# Patient Record
Sex: MALE | Race: WHITE | Employment: FULL TIME | URBAN - METROPOLITAN AREA
[De-identification: names, ages, dates, MRNs, and addresses within clinical notes are randomized per-mention and may not be internally consistent; named-entity substitution may affect disease eponyms.]

---

## 2018-07-30 ENCOUNTER — HOSPITAL ENCOUNTER (EMERGENCY)
Age: 53
Discharge: HOME | End: 2018-07-30
Attending: EMERGENCY MEDICINE
Payer: COMMERCIAL

## 2018-07-30 VITALS
WEIGHT: 200 LBS | HEIGHT: 72 IN | OXYGEN SATURATION: 99 % | DIASTOLIC BLOOD PRESSURE: 60 MMHG | BODY MASS INDEX: 27.09 KG/M2 | SYSTOLIC BLOOD PRESSURE: 110 MMHG | TEMPERATURE: 98.4 F | RESPIRATION RATE: 16 BRPM | HEART RATE: 48 BPM

## 2018-07-30 DIAGNOSIS — G43.901 MIGRAINE WITH STATUS MIGRAINOSUS, NOT INTRACTABLE, UNSPECIFIED MIGRAINE TYPE: Primary | ICD-10-CM

## 2018-07-30 PROCEDURE — 96372 THER/PROPH/DIAG INJ SC/IM: CPT

## 2018-07-30 PROCEDURE — 6360000002 HC RX W HCPCS: Performed by: EMERGENCY MEDICINE

## 2018-07-30 PROCEDURE — 99282 EMERGENCY DEPT VISIT SF MDM: CPT

## 2018-07-30 RX ORDER — ATORVASTATIN CALCIUM 20 MG/1
20 TABLET, FILM COATED ORAL DAILY
COMMUNITY

## 2018-07-30 RX ORDER — VENLAFAXINE 50 MG/1
50 TABLET ORAL 3 TIMES DAILY
COMMUNITY

## 2018-07-30 RX ORDER — ARIPIPRAZOLE 5 MG/1
5 TABLET ORAL DAILY
COMMUNITY

## 2018-07-30 RX ORDER — LISINOPRIL 40 MG/1
40 TABLET ORAL DAILY
COMMUNITY

## 2018-07-30 RX ORDER — RIZATRIPTAN BENZOATE 10 MG/1
10 TABLET ORAL
Qty: 30 TABLET | Refills: 3 | Status: SHIPPED | OUTPATIENT
Start: 2018-07-30 | End: 2018-07-30

## 2018-07-30 RX ORDER — SUMATRIPTAN 6 MG/.5ML
6 INJECTION, SOLUTION SUBCUTANEOUS ONCE
Status: COMPLETED | OUTPATIENT
Start: 2018-07-30 | End: 2018-07-30

## 2018-07-30 RX ADMIN — SUMATRIPTAN 6 MG: 6 INJECTION SUBCUTANEOUS at 11:33

## 2021-07-27 ENCOUNTER — HOSPITAL ENCOUNTER (OUTPATIENT)
Dept: GENERAL RADIOLOGY | Age: 56
Discharge: HOME | End: 2021-07-29

## 2021-07-27 ENCOUNTER — HOSPITAL ENCOUNTER (OUTPATIENT)
Age: 56
Discharge: HOME | End: 2021-07-27

## 2021-07-27 DIAGNOSIS — Z00.00 PHYSICAL EXAM: ICD-10-CM

## 2021-07-27 PROCEDURE — 71046 X-RAY EXAM CHEST 2 VIEWS: CPT

## 2023-10-18 ENCOUNTER — HOSPITAL ENCOUNTER (OUTPATIENT)
Dept: PSYCHIATRY | Age: 58
Setting detail: THERAPIES SERIES
Discharge: HOME | End: 2023-10-18

## 2023-10-18 NOTE — CONSULTS
Department of Psychiatry  ECT consult   Psychiatric Assessment  10/18/2023    TELEHEALTH EVALUATION -- Audio/Visual        Brandon Mosley, was evaluated through a synchronous (real-time) audio-video encounter. The patient (or guardian if applicable) is aware that this is a billable service, which includes applicable co-pays. This Virtual Visit was conducted with patient's (and/or legal guardian's) consent. Patient identification was verified, and a caregiver was present when appropriate. The patient was located at Home: [address] [place] [address] Dr. Christie Pelletier [id number]  Provider was located at [address]): [address],  [address]  I am licensed in the state of [place] where the patient resides        Thank you very much for allowing us to participate in the care of this patient. Reason for Consult:  evaluation for ECT    HISTORY OF PRESENT ILLNESS:          The patient is a 62 y.o. left-handed male with significant history of major depressive disorder, generalized anxiety disorder, panic disorder who is referred by his outpatient psychiatrist for evaluation of ECT. Patient is present with his wife on the call and patient gives permission to have his wife participate in the interview. They are located in their home. Visit is conducted over telehealth    Patient reports suffering from severe anxiety and depression his whole life. He states that he has been on numerous medications as outlined below in his both current and past medication history. He states when the medications do help to alleviate the intensity of his depression and anxiety they have not been able to relieve him of that. Furthermore he reports worsening depression and anxiety over the past several months to years that has led to an inability to maintain a full-time job. He has had problems with maintaining employment due to requiring absences due to his anxiety and depression.   This is a significant decrease in his function as he

## 2023-10-20 DIAGNOSIS — F33.2 SEVERE RECURRENT MAJOR DEPRESSION WITHOUT PSYCHOTIC FEATURES (HCC): Primary | ICD-10-CM

## 2023-10-20 DIAGNOSIS — Z01.818 PRE-OP TESTING: ICD-10-CM

## 2023-10-22 NOTE — H&P (VIEW-ONLY)
sounds. No localized tenderness. No guarding or rigidity. LYMPHATICS:  No palpable cervical lymphadenopathy. LOCOMOTOR, BACK AND SPINE:  No tenderness or deformities. EXTREMITIES:  Symmetrical, no pretibial edema. No calf tenderness. No discoloration or ulcerations. NEUROLOGIC:  The patient is conscious, alert, oriented,Cranial nerve II-XII intact, taste and smell were not examined. No apparent focal sensory or motor deficits. LAB REVIEW      Lab Results   Component Value Date    WBC 7.0 10/23/2023    RBC 4.92 10/23/2023    HGB 13.4 (L) 10/23/2023    HCT 40.6 (L) 10/23/2023    MCV 82.7 10/23/2023    MCH 27.3 10/23/2023    MCHC 33.1 10/23/2023    RDW 16.1 (H) 10/23/2023     10/23/2023    MPV 7.7 10/23/2023        Lab Results   Component Value Date     04/08/2015    K 4.5 04/08/2015    CL 95 (L) 04/08/2015    CO2 26 04/08/2015    BUN 17 04/08/2015    CREATININE 1.0 04/08/2015    GLUCOSE 111 (H) 04/08/2015    CALCIUM 9.7 04/08/2015    PROT 8.1 04/08/2015    LABALBU 4.9 04/08/2015    BILITOT 1.1 04/08/2015    ALKPHOS 79 04/08/2015     (H) 04/08/2015     (H) 04/08/2015                                         EKG REVIEW        Preliminary EKG was done today reading Normal Sinus Rhythm                PROVISIONAL DIAGNOSES / SURGERY:      There are no problems to display for this patient. CLEARANCE:   Based on my personal evaluation of patient including review of patient's chart, no clearance required for scheduled surgery .      DI FENTON, JOURDAN - CNP on 10/23/2023 at 8:20 AM    Total time spent on encounter- PAT provider minutes: 21-30 minutes    Note marked for cosign by provider

## 2023-10-22 NOTE — H&P (VIEW-ONLY)
too far for him to get benefit from [address] alone. After discussion of risk benefits and alternatives with regards to other treatment modalities we did agree that the patient and his wife would think about ECT and call us if they decide to move forward. Spent time in discussing side effects to ECT, specifically emphasize prominent cognitive side effects and short-term memory impairment associated with ECT. Patient does describe his depression as feeling down, depressed, low energy, low mood, poor appetite, poor sleep, poor concentration, poor energy, suicidal thoughts. Denying any auditory or visual hallucinations. Denying any signs or symptoms consistent with daysi. Does endorse also feeling excess anxiety which presents in the form of feeling keyed up or on edge, low energy, fatigue, muscle tension, racing thoughts or distressing thoughts with regards to worries and he feels the worry is excessive and disruptive to his quality of life. UPDATE  Pt reports that he has never had any  ECT treatment before. He reports that he has exhausted many modalities and is willing to try ECT now. Pt is Rating average mood 4/10  with 10 being the best mood  patient states he's been more anxious. . Pt admits to  moderate depression  with low energy. Patient's sleep has been OK . Appetite has been fine . Pt denies any thoughts of suicidal, recently. Pt also denies any thoughts of homicidal. Pt denies auditory/visual hallucinations. Pt reports being compliant with taking all prescribed medications. Pt AAO x 3 in NAD. HRRR. No adventitious lung sounds. No respiratory distress. NPO p MN. Took NO medications this am. Denies recent or current chest pain/pressure, palpitations, SOB, recent URI, fever or chills. Patient denies any changes in medical condition. Patient denies any issues with Anesthesia. Review vitals per RN flowsheet.       Significant medical history: Hypertension, HLD, PreDiabetes, Gout,

## 2023-10-22 NOTE — H&P (VIEW-ONLY)
HISTORY and [id number]       NAME:  Annetta Feng  MRN: [id number]   YOB: 1965   Date: 10/23/2023   Age: 62 y.o. Gender: male       COMPLAINT AND PRESENT HISTORY:     Annetta Feng is 62 y.o. , male, Preadmission Testing for ECT treatments. Psychiatry assessment per Dr Yassine Ross  10/18,2023  HISTORY OF PRESENT ILLNESS:    The patient is a 62 y.o. left-handed male with significant history of major depressive disorder, generalized anxiety disorder, panic disorder who is referred by his outpatient psychiatrist for evaluation of ECT. Patient is present with his wife on the call and patient gives permission to have his wife participate in the interview. They are located in their home. Visit is conducted over telehealth     Patient reports suffering from severe anxiety and depression his whole life. He states that he has been on numerous medications as outlined below in his both current and past medication history. He states when the medications do help to alleviate the intensity of his depression and anxiety they have not been able to relieve him of that. Furthermore he reports worsening depression and anxiety over the past several months to years that has led to an inability to maintain a full-time job. He has had problems with maintaining employment due to requiring absences due to his anxiety and depression. This is a significant decrease in his function as he has been employed at [employer] for over 20 years with good job attendance. He endorses passive suicidal ideations but denies any intention or plan and is able to contract for safety. He currently is involved with his therapist Renetta Pitts as well as his outpatient psychiatrist Dr. Bryan Sorenson in [place]. He has had discussions of transcranial magnetic stimulation, ECT.   At present time he reports after discussion about [place] with his outpatient psychiatrist the feeling was that things have advanced

## 2023-10-23 ENCOUNTER — HOSPITAL ENCOUNTER (OUTPATIENT)
Dept: PREADMISSION TESTING | Age: 58
Discharge: HOME | End: 2023-10-27
Payer: COMMERCIAL

## 2023-10-23 VITALS
BODY MASS INDEX: 26.82 KG/M2 | DIASTOLIC BLOOD PRESSURE: 75 MMHG | SYSTOLIC BLOOD PRESSURE: 111 MMHG | RESPIRATION RATE: 18 BRPM | WEIGHT: 198 LBS | TEMPERATURE: 97.3 F | HEIGHT: 72 IN | HEART RATE: 96 BPM | OXYGEN SATURATION: 95 %

## 2023-10-23 DIAGNOSIS — Z01.818 PRE-OP TESTING: ICD-10-CM

## 2023-10-23 LAB
ANION GAP SERPL CALCULATED.3IONS-SCNC: 11 MMOL/L (ref 9–17)
BASOPHILS # BLD: 0 K/UL (ref 0–0.2)
BASOPHILS NFR BLD: 0 % (ref 0–2)
BUN SERPL-MCNC: 15 MG/DL (ref 6–20)
CALCIUM SERPL-MCNC: 9.2 MG/DL (ref 8.6–10.4)
CHLORIDE SERPL-SCNC: 99 MMOL/L (ref 98–107)
CO2 SERPL-SCNC: 27 MMOL/L (ref 20–31)
CREAT SERPL-MCNC: 1 MG/DL (ref 0.7–1.2)
EOSINOPHIL # BLD: 0.1 K/UL (ref 0–0.4)
EOSINOPHILS RELATIVE PERCENT: 1 % (ref 0–4)
ERYTHROCYTE [DISTWIDTH] IN BLOOD BY AUTOMATED COUNT: 16.1 % (ref 11.5–14.9)
GFR SERPL CREATININE-BSD FRML MDRD: >60 ML/MIN/1.73M2
GLUCOSE SERPL-MCNC: 163 MG/DL (ref 70–99)
HCT VFR BLD AUTO: 40.6 % (ref 41–53)
HGB BLD-MCNC: 13.4 G/DL (ref 13.5–17.5)
LYMPHOCYTES NFR BLD: 2.2 K/UL (ref 1–4.8)
LYMPHOCYTES RELATIVE PERCENT: 31 % (ref 24–44)
MCH RBC QN AUTO: 27.3 PG (ref 26–34)
MCHC RBC AUTO-ENTMCNC: 33.1 G/DL (ref 31–37)
MCV RBC AUTO: 82.7 FL (ref 80–100)
MONOCYTES NFR BLD: 0.5 K/UL (ref 0.1–1.3)
MONOCYTES NFR BLD: 7 % (ref 1–7)
NEUTROPHILS NFR BLD: 61 % (ref 36–66)
NEUTS SEG NFR BLD: 4.2 K/UL (ref 1.3–9.1)
PLATELET # BLD AUTO: 221 K/UL (ref 150–450)
PMV BLD AUTO: 7.7 FL (ref 6–12)
POTASSIUM SERPL-SCNC: 4.4 MMOL/L (ref 3.7–5.3)
RBC # BLD AUTO: 4.92 M/UL (ref 4.5–5.9)
SODIUM SERPL-SCNC: 137 MMOL/L (ref 135–144)
WBC OTHER # BLD: 7 K/UL (ref 3.5–11)

## 2023-10-23 PROCEDURE — 93005 ELECTROCARDIOGRAM TRACING: CPT | Performed by: ANESTHESIOLOGY

## 2023-10-23 PROCEDURE — 85025 COMPLETE CBC W/AUTO DIFF WBC: CPT

## 2023-10-23 PROCEDURE — 36415 COLL VENOUS BLD VENIPUNCTURE: CPT

## 2023-10-23 PROCEDURE — 80048 BASIC METABOLIC PNL TOTAL CA: CPT

## 2023-10-23 RX ORDER — CARIPRAZINE 3 MG/1
3 CAPSULE, GELATIN COATED ORAL DAILY
COMMUNITY
Start: 2023-09-11

## 2023-10-23 RX ORDER — PROPRANOLOL HYDROCHLORIDE 10 MG/1
10 TABLET ORAL
COMMUNITY
Start: 2022-01-05

## 2023-10-23 RX ORDER — BUSPIRONE HYDROCHLORIDE 30 MG/1
30 TABLET ORAL 2 TIMES DAILY
COMMUNITY
Start: 2023-10-03

## 2023-10-23 RX ORDER — OMEPRAZOLE 20 MG/1
20 CAPSULE, DELAYED RELEASE ORAL DAILY
COMMUNITY

## 2023-10-23 NOTE — DISCHARGE INSTRUCTIONS
Pre-op Instructions For Out-Patient Endoscopy Surgery    Medication Instructions:  Please stop herbs and any supplements now (includes vitamins and minerals). Please contact your surgeon and prescribing physician for pre-op instructions for any blood thinners. If you have inhalers/aerosol treatments at home, please use them the morning of your surgery and bring the inhalers with you to the hospital.    Please take the following medications the morning of your surgery with a sip of water:    Propranolol, Prilosec    Surgery Instructions:  After midnight before surgery:  Do not eat or drink anything, including water, mints, gum, and hard candy. You may brush your teeth without swallowing. No smoking, chewing tobacco, or street drugs. Please shower or bathe before surgery. Please do not wear any cologne, lotion, powder, jewelry, piercings, perfume, makeup, nail polish, hair accessories, or hair spray on the day of surgery. Wear loose comfortable clothing. Leave your valuables at home but bring a payment source for any after-surgery prescriptions you plan to fill at [facility]. Bring a storage case for any glasses/contacts. An adult who is responsible for you MUST drive you home and should be with you for the first 24 hours after surgery. The Day of Surgery:  Arrive at [facility] AT [facility] Surgery Entrance at the time directed by your surgeon and check in at the desk. If you have a living will or healthcare power of , please bring a copy. You will be taken to the pre-op holding area where you will be prepared for surgery. A physical assessment will be performed by a nurse practitioner or house officer. Your IV will be started and you will meet your anesthesiologist.    When you go to surgery, your family will be directed to the surgical waiting room, where the doctor should speak with them after your surgery.     After surgery, you will be taken

## 2023-10-24 ENCOUNTER — ANESTHESIA EVENT (OUTPATIENT)
Dept: POSTOP/PACU | Age: 58
End: 2023-10-24
Payer: COMMERCIAL

## 2023-10-24 DIAGNOSIS — F33.2 SEVERE RECURRENT MAJOR DEPRESSION WITHOUT PSYCHOTIC FEATURES (HCC): Primary | ICD-10-CM

## 2023-10-24 LAB
EKG ATRIAL RATE: 87 BPM
EKG P AXIS: 18 DEGREES
EKG P-R INTERVAL: 174 MS
EKG Q-T INTERVAL: 366 MS
EKG QRS DURATION: 96 MS
EKG QTC CALCULATION (BAZETT): 440 MS
EKG R AXIS: 10 DEGREES
EKG T AXIS: -23 DEGREES
EKG VENTRICULAR RATE: 87 BPM

## 2023-10-24 PROCEDURE — 93010 ELECTROCARDIOGRAM REPORT: CPT | Performed by: INTERNAL MEDICINE

## 2023-10-25 ENCOUNTER — ANESTHESIA (OUTPATIENT)
Dept: POSTOP/PACU | Age: 58
End: 2023-10-25
Payer: COMMERCIAL

## 2023-10-25 ENCOUNTER — HOSPITAL ENCOUNTER (OUTPATIENT)
Dept: POSTOP/PACU | Age: 58
Discharge: HOME | End: 2023-10-25
Payer: COMMERCIAL

## 2023-10-25 VITALS
DIASTOLIC BLOOD PRESSURE: 83 MMHG | BODY MASS INDEX: 26.82 KG/M2 | HEIGHT: 72 IN | SYSTOLIC BLOOD PRESSURE: 96 MMHG | HEART RATE: 80 BPM | RESPIRATION RATE: 19 BRPM | TEMPERATURE: 96.9 F | OXYGEN SATURATION: 92 % | WEIGHT: 198 LBS

## 2023-10-25 PROCEDURE — 3700000000 HC ANESTHESIA ATTENDED CARE: Performed by: ANESTHESIOLOGY

## 2023-10-25 PROCEDURE — 90870 ELECTROCONVULSIVE THERAPY: CPT | Performed by: PSYCHIATRY & NEUROLOGY

## 2023-10-25 PROCEDURE — 7100000001 HC PACU RECOVERY - ADDTL 15 MIN: Performed by: ANESTHESIOLOGY

## 2023-10-25 PROCEDURE — 2500000003 HC RX 250 WO HCPCS: Performed by: ANESTHESIOLOGY

## 2023-10-25 PROCEDURE — 90870 ELECTROCONVULSIVE THERAPY: CPT | Performed by: ANESTHESIOLOGY

## 2023-10-25 PROCEDURE — 7100000000 HC PACU RECOVERY - FIRST 15 MIN: Performed by: ANESTHESIOLOGY

## 2023-10-25 PROCEDURE — 2500000003 HC RX 250 WO HCPCS: Performed by: NURSE ANESTHETIST, CERTIFIED REGISTERED

## 2023-10-25 PROCEDURE — 2580000003 HC RX 258: Performed by: ANESTHESIOLOGY

## 2023-10-25 RX ORDER — DIPHENHYDRAMINE HYDROCHLORIDE 50 MG/ML
12.5 INJECTION INTRAMUSCULAR; INTRAVENOUS
Status: DISCONTINUED | OUTPATIENT
Start: 2023-10-25 | End: 2023-10-26 | Stop reason: HOSPADM

## 2023-10-25 RX ORDER — SODIUM CHLORIDE 9 MG/ML
INJECTION, SOLUTION INTRAVENOUS PRN
Status: DISCONTINUED | OUTPATIENT
Start: 2023-10-25 | End: 2023-10-26 | Stop reason: HOSPADM

## 2023-10-25 RX ORDER — SEMAGLUTIDE 1.34 MG/ML
1 INJECTION, SOLUTION SUBCUTANEOUS
COMMUNITY
Start: 2023-10-09

## 2023-10-25 RX ORDER — METHOHEXITAL IN WATER/PF 100MG/10ML
SYRINGE (ML) INTRAVENOUS
Status: COMPLETED
Start: 2023-10-25 | End: 2023-10-25

## 2023-10-25 RX ORDER — SODIUM CHLORIDE, SODIUM LACTATE, POTASSIUM CHLORIDE, CALCIUM CHLORIDE 600; 310; 30; 20 MG/100ML; MG/100ML; MG/100ML; MG/100ML
INJECTION, SOLUTION INTRAVENOUS CONTINUOUS
Status: DISCONTINUED | OUTPATIENT
Start: 2023-10-25 | End: 2023-10-26 | Stop reason: HOSPADM

## 2023-10-25 RX ORDER — SUCCINYLCHOLINE/SOD CL,ISO/PF 200MG/10ML
SYRINGE (ML) INTRAVENOUS PRN
Status: DISCONTINUED | OUTPATIENT
Start: 2023-10-25 | End: 2023-10-25 | Stop reason: SDUPTHER

## 2023-10-25 RX ORDER — ONDANSETRON 2 MG/ML
4 INJECTION INTRAMUSCULAR; INTRAVENOUS
Status: DISCONTINUED | OUTPATIENT
Start: 2023-10-25 | End: 2023-10-26 | Stop reason: HOSPADM

## 2023-10-25 RX ORDER — LIDOCAINE HYDROCHLORIDE 10 MG/ML
1 INJECTION, SOLUTION EPIDURAL; INFILTRATION; INTRACAUDAL; PERINEURAL
Status: COMPLETED | OUTPATIENT
Start: 2023-10-25 | End: 2023-10-25

## 2023-10-25 RX ORDER — METHOHEXITAL IN WATER/PF 100MG/10ML
SYRINGE (ML) INTRAVENOUS PRN
Status: DISCONTINUED | OUTPATIENT
Start: 2023-10-25 | End: 2023-10-25 | Stop reason: SDUPTHER

## 2023-10-25 RX ORDER — SODIUM CHLORIDE 0.9 % (FLUSH) 0.9 %
5-40 SYRINGE (ML) INJECTION EVERY 12 HOURS SCHEDULED
Status: DISCONTINUED | OUTPATIENT
Start: 2023-10-25 | End: 2023-10-26 | Stop reason: HOSPADM

## 2023-10-25 RX ORDER — SODIUM CHLORIDE 0.9 % (FLUSH) 0.9 %
5-40 SYRINGE (ML) INJECTION PRN
Status: DISCONTINUED | OUTPATIENT
Start: 2023-10-25 | End: 2023-10-26 | Stop reason: HOSPADM

## 2023-10-25 RX ORDER — FENTANYL CITRATE 0.05 MG/ML
50 INJECTION, SOLUTION INTRAMUSCULAR; INTRAVENOUS EVERY 5 MIN PRN
Status: DISCONTINUED | OUTPATIENT
Start: 2023-10-25 | End: 2023-10-26 | Stop reason: HOSPADM

## 2023-10-25 RX ORDER — UBROGEPANT 100 MG/1
100 TABLET ORAL DAILY PRN
COMMUNITY
Start: 2022-10-26

## 2023-10-25 RX ORDER — SUCCINYLCHOLINE/SOD CL,ISO/PF 200MG/10ML
SYRINGE (ML) INTRAVENOUS
Status: COMPLETED
Start: 2023-10-25 | End: 2023-10-25

## 2023-10-25 RX ORDER — FENTANYL CITRATE 0.05 MG/ML
25 INJECTION, SOLUTION INTRAMUSCULAR; INTRAVENOUS EVERY 5 MIN PRN
Status: DISCONTINUED | OUTPATIENT
Start: 2023-10-25 | End: 2023-10-26 | Stop reason: HOSPADM

## 2023-10-25 RX ADMIN — Medication 100 MG: at 07:53

## 2023-10-25 RX ADMIN — LIDOCAINE HYDROCHLORIDE 1 ML: 10 INJECTION, SOLUTION EPIDURAL; INFILTRATION; INTRACAUDAL; PERINEURAL at 07:25

## 2023-10-25 RX ADMIN — SODIUM CHLORIDE, POTASSIUM CHLORIDE, SODIUM LACTATE AND CALCIUM CHLORIDE: 600; 310; 30; 20 INJECTION, SOLUTION INTRAVENOUS at 07:24

## 2023-10-25 ASSESSMENT — PAIN SCALES - GENERAL
PAINLEVEL_OUTOF10: 3
PAINLEVEL_OUTOF10: 3

## 2023-10-25 ASSESSMENT — PAIN - FUNCTIONAL ASSESSMENT: PAIN_FUNCTIONAL_ASSESSMENT: NONE - DENIES PAIN

## 2023-10-25 ASSESSMENT — PAIN DESCRIPTION - DESCRIPTORS: DESCRIPTORS: ACHING

## 2023-10-25 ASSESSMENT — PAIN DESCRIPTION - PAIN TYPE: TYPE: ACUTE PAIN

## 2023-10-25 ASSESSMENT — PAIN DESCRIPTION - ONSET: ONSET: AWAKENED FROM SLEEP

## 2023-10-25 ASSESSMENT — PAIN DESCRIPTION - LOCATION: LOCATION: HEAD

## 2023-10-25 NOTE — INTERVAL H&P NOTE
Update History & Physical    The patient's History and Physical of October 23, 2023 was reviewed with the patient and I examined the patient. Any changes are as documented below. Vlad Holder is 62 y.o.  male, here for ECT treatment. Pt has not had previous ECT treatments. Rating average mood 7/10 with 10 being the best mood. Patient's sleep has been fair. Appetite has been fair. Pt is not suicidal. Pt is not homicidal. Pt denies auditory/visual hallucinations. Pt has been taking all medications as prescribed. Pt AAO x 3 in NAD. HRRR. No adventitious lung sounds. No respiratory distress. NPO p MN. Took prilosec and propanolol this am with sip of water. Denies recent or current chest pain/pressure, palpitations, SOB, recent URI, fever or chills. Review vitals per RN flowsheet.      Electronically signed by JOURDAN Wei CNP on 10/25/2023 at 7:00 AM

## 2023-10-25 NOTE — DISCHARGE INSTRUCTIONS
Sedation or General Anesthesia, Adult  Care After  Refer to this sheet in the next 24 hours. These instructions provide you with information on caring for yourself after your procedure. Your caregiver may also give you more specific instructions. Your treatment has been planned according to current medical practices, but problems sometimes occur. Call your caregiver if you have any problems or questions after your procedure. HOME CARE INSTRUCTIONS   Do not participate in any activities that require you to be alert or coordinated. Do not:  Drive. Swim. Ride a bicycle. Operate heavy machinery. [place]. Use power tools. Climb ladders. Work at [employer]. Take a bath. Do not drink alcohol. Do not make any important decisions or sign legal documents. Stay with an adult. The first meal following your procedure should be light and small. Avoid solid foods if you feel sick to your stomach (nauseous) or if you throw up (vomit). Drink enough fluids to keep your urine clear or pale yellow. Only take your usual medicines or new medicines if your caregiver approves them. Only take over-the-counter or prescription medicines for pain, discomfort, or fever as directed by your caregiver. Keep all follow-up appointments as directed by your caregiver. SEEK IMMEDIATE MEDICAL CARE IF:   You are not feeling normal or behaving normally after 24 hours. You have persistent nausea and vomiting. You are unable to drink fluids or eat food. You have difficulty urinating. You have difficulty breathing or speaking. You have blue or gray skin. There is difficulty waking or you cannot be woken up. You have heavy bleeding, redness, or a lot of swelling where the sedative or anesthesia entered your skin (intravenous site). You have a rash. MAKE SURE YOU:  Understand these instructions. Will watch your condition. Will get help right away if you are not doing well or get worse.   Document Released: 12/18/2006 Document Revised:

## 2023-10-25 NOTE — PROCEDURES
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)  Postprocedure diagnosis: SAME AS PRE-PROCEDURE DIAGNOSIS  Treatment Number: This is treatment # 1   Patient Status: Outpatient   Type of ECT: Bilateral Brief Pulse  Medications  Preprocedure: Tylenol 650mg  Anesthetic: Methohexital 100 mg  Paralytic: Succinylcholine 100 mg  Post procedure: none needed  mg  Cuff placement: Left Lower Extremity    MECTA Settings 1st Stimulus:     Pulse width: 1.0 ms   Frequency:  40 hz   Duration:   2.0 seconds   Static Impedance: 197 ohms             Dynamic Impedance: 172 ohms             Motor Seizure Duration: 41 seconds  EEG Seizure Duration: 60 seconds             The patient's ECT treatment was performed using MECTA machine  . Timeout:  Time out was performed using two patient identifiers and confirmation of procedure. Patient Preparation: Preprocedure documentation, labs, H&P were all verified and reviewed. The patient was placed in supine position. EEG leads were placed for monitoring of seizure. [place] areas bilaterally cleaned and prepped. Conductive gel  was applied over stimulus electrode site. The patient was pre-oxygenated. Procedure in detail: Medications were administered by anesthesia using intravenous access at the doses listed previously in this summary. Anesthetic administered and once confirmation the patient is anesthetized, the patient's blood pressure cuff on lower extremity was inflated to 100mmHg in excess of patient's blood pressure. At this time, the neuromuscular blockade was administered intravenously by anesthesia. Upper extremity fasciculation were verified followed by lower extremity fasciculation. Once fasciculations terminated, confirmation of affective neuromuscular blockade demonstrated by absence of withdrawal reflex.

## 2023-10-26 ENCOUNTER — ANESTHESIA EVENT (OUTPATIENT)
Dept: POSTOP/PACU | Age: 58
End: 2023-10-26
Payer: COMMERCIAL

## 2023-10-26 NOTE — PROGRESS NOTES
Pre ECT Assessment Note  Psychiatry  10/25/2023      Alise Goldman  1965  [id number]      Subjective:     Patient is a 62 y.o.  male seen for an evaluation prior to today's electroconvulsive therapy treatment. Today is treatment number 1, utilizing bilateral.  This is course number 1. The patient has never previously completed a course of ECT. Informed consent was completed with patient, wife also present. Risk/benefit and overall process was explained. He was agreeable to completing a depression screening prior to treatment. We will continue treatments 3x weekly and taper frequency as tolerated. Screening Tools:    MADRS Score 10/25/2023 =  22      There are no problems to display for this patient. Past Medical History:   Diagnosis Date    Depression     GERD (gastroesophageal reflux disease)     Gout     Hypertension     Migraines     Pre-diabetes       Past Surgical History:   Procedure Laterality Date    APPENDECTOMY  2006    COLONOSCOPY      FINGER AMPUTATION Right     middle, partial      Not in a hospital admission. Allergies   Allergen Reactions    Codeine       Social History     Tobacco Use    Smoking status: Never    Smokeless tobacco: Never   Substance Use Topics    Alcohol use: Yes     Comment: social      History reviewed. No pertinent family history.        Objective:       Mental Status Evaluation:  Appearance:  Wearing gown, on stretcher, well groomed   Behavior:  Cooperative, pleasant   Speech:  Normal rate, volume and tone   Mood:  depressed   Affect:  Congruent   Thought Process:  Linear and organized   Thought Content:  Passive suicidal ideation   Sensorium:  Does not appear to attend to internal stimuli   Cognition:  Oriented to person, place and general circumstance   Insight:  good   Judgment:  good     Assessment:     Diagnosis: Major depressive disorder, recurrent, severe without psychotic features    Plan:     Continue bilateral ECT three times weekly  Taper

## 2023-10-27 ENCOUNTER — ANESTHESIA EVENT (OUTPATIENT)
Dept: POSTOP/PACU | Age: 58
End: 2023-10-27
Payer: COMMERCIAL

## 2023-10-27 ENCOUNTER — HOSPITAL ENCOUNTER (OUTPATIENT)
Dept: POSTOP/PACU | Age: 58
Discharge: HOME | End: 2023-10-27
Payer: COMMERCIAL

## 2023-10-27 ENCOUNTER — ANESTHESIA (OUTPATIENT)
Dept: POSTOP/PACU | Age: 58
End: 2023-10-27
Payer: COMMERCIAL

## 2023-10-27 VITALS
OXYGEN SATURATION: 100 % | RESPIRATION RATE: 13 BRPM | HEART RATE: 83 BPM | WEIGHT: 198 LBS | BODY MASS INDEX: 26.82 KG/M2 | DIASTOLIC BLOOD PRESSURE: 84 MMHG | TEMPERATURE: 97.1 F | SYSTOLIC BLOOD PRESSURE: 120 MMHG | HEIGHT: 72 IN

## 2023-10-27 LAB — GLUCOSE BLD-MCNC: 113 MG/DL (ref 75–110)

## 2023-10-27 PROCEDURE — 90870 ELECTROCONVULSIVE THERAPY: CPT

## 2023-10-27 PROCEDURE — 7100000001 HC PACU RECOVERY - ADDTL 15 MIN: Performed by: ANESTHESIOLOGY

## 2023-10-27 PROCEDURE — 90870 ELECTROCONVULSIVE THERAPY: CPT | Performed by: PSYCHIATRY & NEUROLOGY

## 2023-10-27 PROCEDURE — 3700000000 HC ANESTHESIA ATTENDED CARE: Performed by: ANESTHESIOLOGY

## 2023-10-27 PROCEDURE — 2500000003 HC RX 250 WO HCPCS: Performed by: NURSE ANESTHETIST, CERTIFIED REGISTERED

## 2023-10-27 PROCEDURE — 82947 ASSAY GLUCOSE BLOOD QUANT: CPT

## 2023-10-27 PROCEDURE — 2580000003 HC RX 258: Performed by: ANESTHESIOLOGY

## 2023-10-27 PROCEDURE — 7100000000 HC PACU RECOVERY - FIRST 15 MIN: Performed by: ANESTHESIOLOGY

## 2023-10-27 PROCEDURE — 2500000003 HC RX 250 WO HCPCS: Performed by: ANESTHESIOLOGY

## 2023-10-27 RX ORDER — SODIUM CHLORIDE 9 MG/ML
INJECTION, SOLUTION INTRAVENOUS PRN
Status: DISCONTINUED | OUTPATIENT
Start: 2023-10-27 | End: 2023-10-28 | Stop reason: HOSPADM

## 2023-10-27 RX ORDER — SUCCINYLCHOLINE/SOD CL,ISO/PF 200MG/10ML
SYRINGE (ML) INTRAVENOUS
Status: COMPLETED
Start: 2023-10-27 | End: 2023-10-27

## 2023-10-27 RX ORDER — SODIUM CHLORIDE 0.9 % (FLUSH) 0.9 %
5-40 SYRINGE (ML) INJECTION PRN
Status: DISCONTINUED | OUTPATIENT
Start: 2023-10-27 | End: 2023-10-28 | Stop reason: HOSPADM

## 2023-10-27 RX ORDER — SODIUM CHLORIDE 0.9 % (FLUSH) 0.9 %
5-40 SYRINGE (ML) INJECTION EVERY 12 HOURS SCHEDULED
Status: DISCONTINUED | OUTPATIENT
Start: 2023-10-27 | End: 2023-10-28 | Stop reason: HOSPADM

## 2023-10-27 RX ORDER — METHOHEXITAL IN WATER/PF 100MG/10ML
SYRINGE (ML) INTRAVENOUS
Status: COMPLETED
Start: 2023-10-27 | End: 2023-10-27

## 2023-10-27 RX ORDER — SODIUM CHLORIDE, SODIUM LACTATE, POTASSIUM CHLORIDE, CALCIUM CHLORIDE 600; 310; 30; 20 MG/100ML; MG/100ML; MG/100ML; MG/100ML
INJECTION, SOLUTION INTRAVENOUS CONTINUOUS
Status: DISCONTINUED | OUTPATIENT
Start: 2023-10-27 | End: 2023-10-28 | Stop reason: HOSPADM

## 2023-10-27 RX ORDER — SUCCINYLCHOLINE/SOD CL,ISO/PF 100 MG/5ML
SYRINGE (ML) INTRAVENOUS PRN
Status: DISCONTINUED | OUTPATIENT
Start: 2023-10-27 | End: 2023-10-27 | Stop reason: SDUPTHER

## 2023-10-27 RX ORDER — LIDOCAINE HYDROCHLORIDE 10 MG/ML
1 INJECTION, SOLUTION EPIDURAL; INFILTRATION; INTRACAUDAL; PERINEURAL
Status: COMPLETED | OUTPATIENT
Start: 2023-10-27 | End: 2023-10-27

## 2023-10-27 RX ORDER — METHOHEXITAL IN WATER/PF 100MG/10ML
SYRINGE (ML) INTRAVENOUS PRN
Status: DISCONTINUED | OUTPATIENT
Start: 2023-10-27 | End: 2023-10-27 | Stop reason: SDUPTHER

## 2023-10-27 RX ADMIN — SODIUM CHLORIDE, POTASSIUM CHLORIDE, SODIUM LACTATE AND CALCIUM CHLORIDE: 600; 310; 30; 20 INJECTION, SOLUTION INTRAVENOUS at 06:28

## 2023-10-27 RX ADMIN — Medication 140 MG: at 07:14

## 2023-10-27 RX ADMIN — LIDOCAINE HYDROCHLORIDE 1 ML: 10 INJECTION, SOLUTION EPIDURAL; INFILTRATION; INTRACAUDAL; PERINEURAL at 06:28

## 2023-10-27 RX ADMIN — Medication 100 MG: at 07:14

## 2023-10-27 ASSESSMENT — PAIN - FUNCTIONAL ASSESSMENT: PAIN_FUNCTIONAL_ASSESSMENT: 0-10

## 2023-10-27 NOTE — PROCEDURES
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)  Postprocedure diagnosis: SAME AS PRE-PROCEDURE DIAGNOSIS  Treatment Number: This is treatment # 2   Patient Status: Outpatient   Type of ECT: Bilateral Brief Pulse  Medications  Preprocedure: Tylenol 650mg  Anesthetic: Methohexital 100 mg  Paralytic: Succinylcholine 140 mg  Post procedure: none needed  mg  Cuff placement: Left Lower Extremity    MECTA Settings 1st Stimulus:     Pulse width: 1.0 ms   Frequency:  40 hz   Duration:   2.0 seconds   Static Impedance: 182 ohms             Dynamic Impedance: 173 ohms             Motor Seizure Duration: 46 seconds  EEG Seizure Duration: 62 seconds             The patient's ECT treatment was performed using MECTA machine  . Timeout:  Time out was performed using two patient identifiers and confirmation of procedure. Patient Preparation: Preprocedure documentation, labs, H&P were all verified and reviewed. The patient was placed in supine position. EEG leads were placed for monitoring of seizure. [place] areas bilaterally cleaned and prepped. Conductive gel  was applied over stimulus electrode site. The patient was pre-oxygenated. Procedure in detail: Medications were administered by anesthesia using intravenous access at the doses listed previously in this summary. Anesthetic administered and once confirmation the patient is anesthetized, the patient's blood pressure cuff on lower extremity was inflated to 100mmHg in excess of patient's blood pressure. At this time, the neuromuscular blockade was administered intravenously by anesthesia. Upper extremity fasciculation were verified followed by lower extremity fasciculation. Once fasciculations terminated, confirmation of affective neuromuscular blockade demonstrated by absence of withdrawal reflex.

## 2023-10-27 NOTE — H&P (VIEW-ONLY)
breath sounds. No adventitious sounds. ABDOMEN:  Obese. Soft on palpation. Normoactive bowel sounds. No localized tenderness. No guarding or rigidity. LYMPHATICS:  No palpable cervical lymphadenopathy. LOCOMOTOR, BACK AND SPINE:  No tenderness or deformities. EXTREMITIES:  Symmetrical, no pretibial edema. No calf tenderness. No discoloration or ulcerations. NEUROLOGIC:  The patient is conscious, alert, oriented,Cranial nerve II-XII intact, taste and smell were not examined. No apparent focal sensory or motor deficits. LAB REVIEW       Lab Results  Component Value Date    WBC 7.0 10/23/2023    RBC 4.92 10/23/2023    HGB 13.4 (L) 10/23/2023    HCT 40.6 (L) 10/23/2023    MCV 82.7 10/23/2023    MCH 27.3 10/23/2023    MCHC 33.1 10/23/2023    RDW 16.1 (H) 10/23/2023     10/23/2023    MPV 7.7 10/23/2023          Lab Results  Component Value Date     04/08/2015    K 4.5 04/08/2015    CL 95 (L) 04/08/2015    CO2 26 04/08/2015    BUN 17 04/08/2015    CREATININE 1.0 04/08/2015    GLUCOSE 111 (H) 04/08/2015    CALCIUM 9.7 04/08/2015    PROT 8.1 04/08/2015    LABALBU 4.9 04/08/2015    BILITOT 1.1 04/08/2015    ALKPHOS 79 04/08/2015     (H) 04/08/2015     (H) 04/08/2015                                           EKG REVIEW       Preliminary EKG was done today reading Normal Sinus Rhythm                  PROVISIONAL DIAGNOSES / SURGERY:      There are no problems to display for this patient. CLEARANCE:   Based on my personal evaluation of patient including review of patient's chart, no clearance required for scheduled surgery .       DI FENTON, JOURDAN - CNP on 10/23/2023 at 8:20 AM

## 2023-10-27 NOTE — PROGRESS NOTES
Pre ECT Assessment Note  Psychiatry  10/27/2023      Yasemin Garnica  1965  [id number]      Subjective:     Patient is a 62 y.o.  male seen for an evaluation prior to today's electroconvulsive therapy treatment. Today is treatment number 2, utilizing bilateral.  This is course number 1. The patient has never previously completed a course of ECT. Wife present at bedside. Patient does not endorse an improvement in his mood and depression. Denies current suicidal ideations. Patient denies any memory changes or muscle tension after his first treatment. Patient reports sleeping at baseline without appetite changes. Denies AH/VH. Discussed continuing treatments 3x weekly and taper frequency as tolerated. Screening Tools:    MADRS Score 10/27/2023 = {#:[id number]::\"***\"},previous score =  22      There are no problems to display for this patient. Past Medical History:   Diagnosis Date    Depression     GERD (gastroesophageal reflux disease)     Gout     Hypertension     Migraines     Pre-diabetes       Past Surgical History:   Procedure Laterality Date    APPENDECTOMY  2006    COLONOSCOPY      FINGER AMPUTATION Right     middle, partial      Not in a hospital admission. Allergies   Allergen Reactions    Codeine       Social History     Tobacco Use    Smoking status: Never    Smokeless tobacco: Never   Substance Use Topics    Alcohol use: Yes     Comment: social      No family history on file.        Objective:       Mental Status Evaluation:  Appearance:  Wearing gown, on stretcher, well groomed   Behavior:  Cooperative, pleasant   Speech:  Normal rate, volume and tone   Mood:  depressed   Affect:  Congruent   Thought Process:  Linear and organized   Thought Content:  no hallucinations and no delusions   Sensorium:  Does not appear to attend to internal stimuli   Cognition:  Oriented to person, place and general circumstance   Insight:  good   Judgment:  good     Assessment:     Diagnosis: Major

## 2023-10-27 NOTE — PROGRESS NOTES
Pre ECT Assessment Note  Psychiatry  10/27/2023      Kareen Norris  1965  [id number]      Subjective:     Patient is a 62 y.o.  male seen for an evaluation prior to today's electroconvulsive therapy treatment. Today is treatment number 1 utilizing bilateral.  This is course number 1. The patient has never previously completed a course of ECT. Wife \"Akanksha \" present at bedside. Patient does not endorse an improvement in his mood and depression. Denies current suicidal ideations. Patient denies any memory changes or muscle tension after his first treatment. Patient reports sleeping at baseline without appetite changes. Denies auditory or visual hallucinations. Discussed continuing treatments 3x weekly and taper frequency as tolerated. Screening Tools:    MADRS Score 10/27/2023 =  33 ,previous score =  22      There are no problems to display for this patient. Past Medical History:   Diagnosis Date    Depression     GERD (gastroesophageal reflux disease)     Gout     Hypertension     Migraines     Pre-diabetes       Past Surgical History:   Procedure Laterality Date    APPENDECTOMY  2006    COLONOSCOPY      FINGER AMPUTATION Right     middle, partial      Not in a hospital admission. Allergies   Allergen Reactions    Codeine       Social History     Tobacco Use    Smoking status: Never    Smokeless tobacco: Never   Substance Use Topics    Alcohol use: Yes     Comment: social      No family history on file.        Objective:       Mental Status Evaluation:  Appearance:  Wearing gown, on stretcher, well groomed   Behavior:  Cooperative, pleasant   Speech:  Normal rate, volume and tone   Mood:  depressed   Affect:  Congruent   Thought Process:  Linear and organized   Thought Content:  Passive suicidal ideation, no hallucinations and no delusions   Sensorium:  Does not appear to attend to internal stimuli   Cognition:  Oriented to person, place and general circumstance   Insight:  good   Judgment:

## 2023-10-27 NOTE — H&P (VIEW-ONLY)
HISTORY and [address]       NAME:  Sanjuanita Agustin  MRN: [id number]   YOB: 1965   Date: 10/27/2023   Age: 62 y.o. Gender: male     H&P Update Note    H&P from 10/23/2023 reviewed and updated. Patient examined. Patient will be having:   ECT W ANESTHESIA    INTERVAL HISTORY:     Patient is feeling well today, denies any fever/chills, chest pain, shortness of breath. No interval changes. Sanjuantia Agustin is 62 y.o.,  male, here for ECT treatment. Patient is being treated for major depressive disorder   Last ECT treatment was 10/25/23 . Pt reports generalized body ache. Pt tolerated last treatment well. Pt reports ECT treatments have been effective at the current frequency. This is # 2. Rating average mood 7/10  with 10 being the best mood. Pt admits to  moderate depression  with low energy. Patient's sleep has been good . Appetite has been OK . Pt denies any thoughts of suicidal. Pt also denies any thoughts of homicidal. Pt denies auditory/visual hallucinations. Pt reports being compliant with taking all prescribed medications. Pt AAO x 3 in NAD. HRRR. No adventitious lung sounds. No respiratory distress. NPO p MN. Took NO medications this am. Denies recent or current chest pain/pressure, palpitations, SOB, recent URI, fever or chills. Patient denies any changes in medical condition. Patient denies any issues with Anesthesia. No interval changes to past medical history, social history, family history. Review of systems as stated above and otherwise negative. PHYSICAL EXAM:     Vitals :   See vital signs in RN flow sheet. Patient is alert and oriented, in no distress. Jon Jose Heart rate and rhythm are regular. Lungs clear to auscultation bilaterally. Abdomen is soft, non tender. No pedal edema. No interval changes. I concur with the findings.      DI FENTON, JOURDAN - CNP on 10/27/2023 at 5:57 AM    Note marked

## 2023-10-27 NOTE — DISCHARGE INSTRUCTIONS
on the last Monday of the month with holiday exceptions. Attendance may be in person or virtually. The support group is located in person at [facility], [address] [place] [address], [address], in the basement Board Room. It is available virtually, however you must pre-arrange access by emailing [email or web address]. The support group starts at 4p and ends at 5p. Our support group offers a safe place where all aspects of ECT treatment can be discussed openly. Patients and family/friends are welcome to attend. **      If you feel you are having a problem or complication from your ECT treatments and it is during normal business hours call [phone]. If it is after normal business hours please call [facility] at [phone] to speak with a nurse, or go to your nearest emergency room. If you need to schedule, reschedule or cancel an appointment, please call the [address] [place] [address] at [phone]. You may also use this number for any questions regarding the ECT Support Group. You will need to arrange transportation to and from the hospital for all outpatient ECT treatments. Bring a current list of your medications, including dosages with you to every ECT treatment and arrive 1 hour and 15 minutes before your scheduled ECT time.

## 2023-10-27 NOTE — H&P
HISTORY and [address]       NAME:  Sanjuanita Agustin  MRN: [id number]   YOB: 1965   Date: 10/27/2023   Age: 62 y.o. Gender: male     H&P Update Note    H&P from 10/23/2023 reviewed and updated. Patient examined. Patient will be having:   ECT W ANESTHESIA    INTERVAL HISTORY:     Patient is feeling well today, denies any fever/chills, chest pain, shortness of breath. No interval changes. Sanjuanita Agustin is 62 y.o.,  male, here for ECT treatment. Patient is being treated for major depressive disorder   Last ECT treatment was 10/25/23 . Pt reports generalized body ache. Pt tolerated last treatment well. Pt reports ECT treatments have been effective at the current frequency. This is # 2. Rating average mood 7/10  with 10 being the best mood. Pt admits to  moderate depression  with low energy. Patient's sleep has been good . Appetite has been OK . Pt denies any thoughts of suicidal. Pt also denies any thoughts of homicidal. Pt denies auditory/visual hallucinations. Pt reports being compliant with taking all prescribed medications. Pt AAO x 3 in NAD. HRRR. No adventitious lung sounds. No respiratory distress. NPO p MN. Took NO medications this am. Denies recent or current chest pain/pressure, palpitations, SOB, recent URI, fever or chills. Patient denies any changes in medical condition. Patient denies any issues with Anesthesia. No interval changes to past medical history, social history, family history. Review of systems as stated above and otherwise negative. PHYSICAL EXAM:     Vitals :   See vital signs in RN flow sheet. Patient is alert and oriented, in no distress. Jon Jose Heart rate and rhythm are regular. Lungs clear to auscultation bilaterally. Abdomen is soft, non tender. No pedal edema. No interval changes. I concur with the findings.      DI FENTON, JOURDAN - CNP on 10/27/2023 at 5:57 AM    Note marked

## 2023-10-30 ENCOUNTER — HOSPITAL ENCOUNTER (OUTPATIENT)
Dept: POSTOP/PACU | Age: 58
Discharge: HOME | End: 2023-10-30
Payer: COMMERCIAL

## 2023-10-30 ENCOUNTER — ANESTHESIA (OUTPATIENT)
Dept: POSTOP/PACU | Age: 58
End: 2023-10-30
Payer: COMMERCIAL

## 2023-10-30 VITALS
OXYGEN SATURATION: 100 % | RESPIRATION RATE: 11 BRPM | BODY MASS INDEX: 26.84 KG/M2 | DIASTOLIC BLOOD PRESSURE: 81 MMHG | HEIGHT: 72 IN | SYSTOLIC BLOOD PRESSURE: 113 MMHG | HEART RATE: 90 BPM | WEIGHT: 198.19 LBS | TEMPERATURE: 97 F

## 2023-10-30 LAB — GLUCOSE BLD-MCNC: 107 MG/DL (ref 75–110)

## 2023-10-30 PROCEDURE — 82947 ASSAY GLUCOSE BLOOD QUANT: CPT

## 2023-10-30 PROCEDURE — 90870 ELECTROCONVULSIVE THERAPY: CPT

## 2023-10-30 PROCEDURE — 2500000003 HC RX 250 WO HCPCS: Performed by: NURSE ANESTHETIST, CERTIFIED REGISTERED

## 2023-10-30 PROCEDURE — 7100000000 HC PACU RECOVERY - FIRST 15 MIN: Performed by: ANESTHESIOLOGY

## 2023-10-30 PROCEDURE — 3700000001 HC ADD 15 MINUTES (ANESTHESIA): Performed by: ANESTHESIOLOGY

## 2023-10-30 PROCEDURE — 2500000003 HC RX 250 WO HCPCS: Performed by: ANESTHESIOLOGY

## 2023-10-30 PROCEDURE — 2580000003 HC RX 258: Performed by: ANESTHESIOLOGY

## 2023-10-30 PROCEDURE — 3700000000 HC ANESTHESIA ATTENDED CARE: Performed by: ANESTHESIOLOGY

## 2023-10-30 PROCEDURE — 7100000001 HC PACU RECOVERY - ADDTL 15 MIN: Performed by: ANESTHESIOLOGY

## 2023-10-30 PROCEDURE — 90870 ELECTROCONVULSIVE THERAPY: CPT | Performed by: PSYCHIATRY & NEUROLOGY

## 2023-10-30 RX ORDER — SUCCINYLCHOLINE/SOD CL,ISO/PF 200MG/10ML
SYRINGE (ML) INTRAVENOUS
Status: COMPLETED
Start: 2023-10-30 | End: 2023-10-30

## 2023-10-30 RX ORDER — LIDOCAINE HYDROCHLORIDE 10 MG/ML
1 INJECTION, SOLUTION EPIDURAL; INFILTRATION; INTRACAUDAL; PERINEURAL
Status: COMPLETED | OUTPATIENT
Start: 2023-10-30 | End: 2023-10-30

## 2023-10-30 RX ORDER — SUCCINYLCHOLINE/SOD CL,ISO/PF 100 MG/5ML
SYRINGE (ML) INTRAVENOUS PRN
Status: DISCONTINUED | OUTPATIENT
Start: 2023-10-30 | End: 2023-10-30 | Stop reason: SDUPTHER

## 2023-10-30 RX ORDER — SODIUM CHLORIDE 0.9 % (FLUSH) 0.9 %
5-40 SYRINGE (ML) INJECTION PRN
Status: DISCONTINUED | OUTPATIENT
Start: 2023-10-30 | End: 2023-10-31 | Stop reason: HOSPADM

## 2023-10-30 RX ORDER — SODIUM CHLORIDE 0.9 % (FLUSH) 0.9 %
5-40 SYRINGE (ML) INJECTION EVERY 12 HOURS SCHEDULED
Status: DISCONTINUED | OUTPATIENT
Start: 2023-10-30 | End: 2023-10-31 | Stop reason: HOSPADM

## 2023-10-30 RX ORDER — SODIUM CHLORIDE, SODIUM LACTATE, POTASSIUM CHLORIDE, CALCIUM CHLORIDE 600; 310; 30; 20 MG/100ML; MG/100ML; MG/100ML; MG/100ML
INJECTION, SOLUTION INTRAVENOUS CONTINUOUS
Status: DISCONTINUED | OUTPATIENT
Start: 2023-10-30 | End: 2023-10-31 | Stop reason: HOSPADM

## 2023-10-30 RX ORDER — METHOHEXITAL IN WATER/PF 100MG/10ML
SYRINGE (ML) INTRAVENOUS PRN
Status: DISCONTINUED | OUTPATIENT
Start: 2023-10-30 | End: 2023-10-30 | Stop reason: SDUPTHER

## 2023-10-30 RX ORDER — METHOHEXITAL IN WATER/PF 100MG/10ML
SYRINGE (ML) INTRAVENOUS
Status: COMPLETED
Start: 2023-10-30 | End: 2023-10-30

## 2023-10-30 RX ORDER — SODIUM CHLORIDE 9 MG/ML
INJECTION, SOLUTION INTRAVENOUS PRN
Status: DISCONTINUED | OUTPATIENT
Start: 2023-10-30 | End: 2023-10-31 | Stop reason: HOSPADM

## 2023-10-30 RX ADMIN — SODIUM CHLORIDE, POTASSIUM CHLORIDE, SODIUM LACTATE AND CALCIUM CHLORIDE: 600; 310; 30; 20 INJECTION, SOLUTION INTRAVENOUS at 06:11

## 2023-10-30 RX ADMIN — Medication 100 MG: at 07:11

## 2023-10-30 RX ADMIN — Medication 140 MG: at 07:11

## 2023-10-30 RX ADMIN — LIDOCAINE HYDROCHLORIDE 1 ML: 10 INJECTION, SOLUTION EPIDURAL; INFILTRATION; INTRACAUDAL; PERINEURAL at 06:11

## 2023-10-30 ASSESSMENT — PAIN - FUNCTIONAL ASSESSMENT: PAIN_FUNCTIONAL_ASSESSMENT: 0-10

## 2023-10-30 NOTE — PROCEDURES
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)  Postprocedure diagnosis: SAME AS PRE-PROCEDURE DIAGNOSIS  Treatment Number: This is treatment # 3   Patient Status: Outpatient   Type of ECT: Bilateral Brief Pulse  Medications  Preprocedure: Tylenol 650mg  Anesthetic: Methohexital 100 mg  Paralytic: Succinylcholine 140 mg  Post procedure: none needed  mg  Cuff placement: Left Lower Extremity    MECTA Settings 1st Stimulus:     Pulse width: 1.0 ms   Frequency:  40 hz   Duration:   2.0 seconds   Static Impedance: 275 ohms             Dynamic Impedance: 203 ohms             Motor Seizure Duration: 37 seconds  EEG Seizure Duration: 54 seconds             The patient's ECT treatment was performed using MECTA machine  . Timeout:  Time out was performed using two patient identifiers and confirmation of procedure. Patient Preparation: Preprocedure documentation, labs, H&P were all verified and reviewed. The patient was placed in supine position. EEG leads were placed for monitoring of seizure. [place] areas bilaterally cleaned and prepped. Conductive gel  was applied over stimulus electrode site. The patient was pre-oxygenated. Procedure in detail: Medications were administered by anesthesia using intravenous access at the doses listed previously in this summary. Anesthetic administered and once confirmation the patient is anesthetized, the patient's blood pressure cuff on lower extremity was inflated to 100mmHg in excess of patient's blood pressure. At this time, the neuromuscular blockade was administered intravenously by anesthesia. Upper extremity fasciculation were verified followed by lower extremity fasciculation. Once fasciculations terminated, confirmation of affective neuromuscular blockade demonstrated by absence of withdrawal reflex.

## 2023-10-30 NOTE — INTERVAL H&P NOTE
Update History & Physical    The patient's History and Physical of October 23, 2023 was reviewed with the patient and I examined the patient. There was no change. The surgical site was confirmed by the patient and me. Kamila Patino is 62 y.o.,  male, here for ECT treatment. Last ECT treatment was 10/23/23 . Pt tolerated last treatment well. Pt reports ECT treatments are too soon to give evaluation at current frequency. Rating average mood 7/10  with 10 being the best mood. Pt admits to  minimal depression  with low energy. Patient's sleep has been OK . Appetite has been OK . Pt denies any thoughts of suicidal. Pt also denies any thoughts of homicidal. Pt denies auditory/visual hallucinations. Pt reports being compliant with taking all prescribed medications. Pt AAO x 3 in NAD. HRRR. No adventitious lung sounds. No respiratory distress. NPO p MN. Took NO medications this am. Denies recent or current chest pain/pressure, palpitations, SOB, recent URI, fever or chills. Patient denies any changes in medical condition. Patient denies any issues with Anesthesia. Review vitals per RN flowsheet. Plan: The risks, benefits, expected outcome, and alternative to the recommended procedure have been discussed with the patient. Patient understands and wants to proceed with the procedure.      Electronically signed by JOURDAN Vanessa CNP on 10/30/2023 at 5:42 AM

## 2023-10-30 NOTE — DISCHARGE INSTRUCTIONS
ELECTROCONVULSIVE THERAPY DISCHARGE INSTRUCTIONS         1. Activity - Rest today. The anesthetic agents you have received can make you feel drowsy or tired. A responsible person must drive you home and stay with you for 8 hours after discharge from the [address] [place] [address] not drive a motor vehicle or operate any machinery for 24 hours. .   *Do not make any complex decisions or execute any legal documents until 3 weeks AFTER your last treatment. If you have any questions regarding this, speak with your psychiatrist first.*    2. Diet - Nothing to eat or drink after midnight the night of your ECT treatment. After ECT, start with clear liquids, if you can drink without coughing, you may proceed with gradually progressing to your usual diet. No alcoholic beverages for 24 hours. 3. Medications - Take your daily medications as prescribed, after you return home from today's ECT. If you take a Beta Blocker or have been prescribed Imitrex, you may be instructed to take these medications the morning of ECT. If you are unsure please ask the physician. Take with these medication the morning of ECT with one Tablespoon of water. Tylenol 650 mg  All blood pressure medications  ***    4. You may experience the following after your treatment:   a. Poor memory   b. Poor balance   c. Headaches   d. Confusion   e. Muscle soreness   f. Nausea      5. Special Precautions - Contact you physician immediately if these occur:   a. Signs of infection: redness, swelling, heat, red streaks at the site of the IV   b. Excessive pain unrelieved by prescription pain medications   c. Nausea and vomiting that persists beyond the first day after your procedure    6. Next Procedure Date:   Your next ECT treatment is scheduled for 700 am on November 1st.  Please arrive to the hospital by 530 am that morning. **You are also invited to join us at our monthly Electroconvulsive Therapy Peer Support Group.   This support group is held once a month,

## 2023-10-31 ENCOUNTER — ANESTHESIA EVENT (OUTPATIENT)
Dept: POSTOP/PACU | Age: 58
End: 2023-10-31
Payer: COMMERCIAL

## 2023-11-01 ENCOUNTER — HOSPITAL ENCOUNTER (OUTPATIENT)
Dept: POSTOP/PACU | Age: 58
Discharge: HOME | End: 2023-11-01
Payer: COMMERCIAL

## 2023-11-01 ENCOUNTER — ANESTHESIA (OUTPATIENT)
Dept: POSTOP/PACU | Age: 58
End: 2023-11-01
Payer: COMMERCIAL

## 2023-11-01 VITALS
BODY MASS INDEX: 26.82 KG/M2 | TEMPERATURE: 97.2 F | HEART RATE: 84 BPM | RESPIRATION RATE: 18 BRPM | DIASTOLIC BLOOD PRESSURE: 75 MMHG | SYSTOLIC BLOOD PRESSURE: 104 MMHG | WEIGHT: 198 LBS | OXYGEN SATURATION: 91 % | HEIGHT: 72 IN

## 2023-11-01 LAB — GLUCOSE BLD-MCNC: 127 MG/DL (ref 75–110)

## 2023-11-01 PROCEDURE — 82947 ASSAY GLUCOSE BLOOD QUANT: CPT

## 2023-11-01 PROCEDURE — 7100000001 HC PACU RECOVERY - ADDTL 15 MIN: Performed by: ANESTHESIOLOGY

## 2023-11-01 PROCEDURE — 90870 ELECTROCONVULSIVE THERAPY: CPT | Performed by: PSYCHIATRY & NEUROLOGY

## 2023-11-01 PROCEDURE — 90870 ELECTROCONVULSIVE THERAPY: CPT

## 2023-11-01 PROCEDURE — 2500000003 HC RX 250 WO HCPCS: Performed by: NURSE ANESTHETIST, CERTIFIED REGISTERED

## 2023-11-01 PROCEDURE — 3700000000 HC ANESTHESIA ATTENDED CARE: Performed by: ANESTHESIOLOGY

## 2023-11-01 PROCEDURE — 7100000000 HC PACU RECOVERY - FIRST 15 MIN: Performed by: ANESTHESIOLOGY

## 2023-11-01 PROCEDURE — 3700000001 HC ADD 15 MINUTES (ANESTHESIA): Performed by: ANESTHESIOLOGY

## 2023-11-01 PROCEDURE — 2580000003 HC RX 258: Performed by: ANESTHESIOLOGY

## 2023-11-01 RX ORDER — LIDOCAINE HYDROCHLORIDE 10 MG/ML
1 INJECTION, SOLUTION EPIDURAL; INFILTRATION; INTRACAUDAL; PERINEURAL
Status: DISCONTINUED | OUTPATIENT
Start: 2023-11-01 | End: 2023-11-02 | Stop reason: HOSPADM

## 2023-11-01 RX ORDER — SODIUM CHLORIDE, SODIUM LACTATE, POTASSIUM CHLORIDE, CALCIUM CHLORIDE 600; 310; 30; 20 MG/100ML; MG/100ML; MG/100ML; MG/100ML
INJECTION, SOLUTION INTRAVENOUS CONTINUOUS
Status: DISCONTINUED | OUTPATIENT
Start: 2023-11-01 | End: 2023-11-02 | Stop reason: HOSPADM

## 2023-11-01 RX ORDER — SODIUM CHLORIDE 9 MG/ML
INJECTION, SOLUTION INTRAVENOUS PRN
Status: DISCONTINUED | OUTPATIENT
Start: 2023-11-01 | End: 2023-11-02 | Stop reason: HOSPADM

## 2023-11-01 RX ORDER — SODIUM CHLORIDE 0.9 % (FLUSH) 0.9 %
5-40 SYRINGE (ML) INJECTION PRN
Status: DISCONTINUED | OUTPATIENT
Start: 2023-11-01 | End: 2023-11-02 | Stop reason: HOSPADM

## 2023-11-01 RX ORDER — METHOHEXITAL IN WATER/PF 100MG/10ML
SYRINGE (ML) INTRAVENOUS
Status: DISPENSED
Start: 2023-11-01 | End: 2023-11-01

## 2023-11-01 RX ORDER — SUCCINYLCHOLINE/SOD CL,ISO/PF 100 MG/5ML
SYRINGE (ML) INTRAVENOUS PRN
Status: DISCONTINUED | OUTPATIENT
Start: 2023-11-01 | End: 2023-11-01 | Stop reason: SDUPTHER

## 2023-11-01 RX ORDER — METHOHEXITAL IN WATER/PF 100MG/10ML
SYRINGE (ML) INTRAVENOUS PRN
Status: DISCONTINUED | OUTPATIENT
Start: 2023-11-01 | End: 2023-11-01 | Stop reason: SDUPTHER

## 2023-11-01 RX ORDER — SODIUM CHLORIDE 0.9 % (FLUSH) 0.9 %
5-40 SYRINGE (ML) INJECTION EVERY 12 HOURS SCHEDULED
Status: DISCONTINUED | OUTPATIENT
Start: 2023-11-01 | End: 2023-11-02 | Stop reason: HOSPADM

## 2023-11-01 RX ORDER — SUCCINYLCHOLINE/SOD CL,ISO/PF 200MG/10ML
SYRINGE (ML) INTRAVENOUS
Status: COMPLETED
Start: 2023-11-01 | End: 2023-11-01

## 2023-11-01 RX ADMIN — Medication 100 MG: at 07:01

## 2023-11-01 RX ADMIN — SODIUM CHLORIDE, POTASSIUM CHLORIDE, SODIUM LACTATE AND CALCIUM CHLORIDE: 600; 310; 30; 20 INJECTION, SOLUTION INTRAVENOUS at 06:15

## 2023-11-01 RX ADMIN — Medication 140 MG: at 07:01

## 2023-11-01 ASSESSMENT — PAIN - FUNCTIONAL ASSESSMENT: PAIN_FUNCTIONAL_ASSESSMENT: 0-10

## 2023-11-01 ASSESSMENT — LIFESTYLE VARIABLES: SMOKING_STATUS: 0

## 2023-11-01 ASSESSMENT — ENCOUNTER SYMPTOMS
SHORTNESS OF BREATH: 0
STRIDOR: 0

## 2023-11-01 NOTE — ANESTHESIA PRE PROCEDURE
7.0 10/23/2023 07:30 AM    RBC 4.92 10/23/2023 07:30 AM    HGB 13.4 10/23/2023 07:30 AM    HCT 40.6 10/23/2023 07:30 AM    MCV 82.7 10/23/2023 07:30 AM    RDW 16.1 10/23/2023 07:30 AM     10/23/2023 07:30 AM       CMP:   Lab Results   Component Value Date/Time     10/23/2023 07:30 AM    K 4.4 10/23/2023 07:30 AM    CL 99 10/23/2023 07:30 AM    CO2 27 10/23/2023 07:30 AM    BUN 15 10/23/2023 07:30 AM    CREATININE 1.0 10/23/2023 07:30 AM    GFRAA >60 04/08/2015 01:23 PM    LABGLOM >60 10/23/2023 07:30 AM    GLUCOSE 163 10/23/2023 07:30 AM    PROT 8.1 04/08/2015 01:23 PM    CALCIUM 9.2 10/23/2023 07:30 AM    BILITOT 1.1 04/08/2015 01:23 PM    ALKPHOS 79 04/08/2015 01:23 PM     04/08/2015 01:23 PM     04/08/2015 01:23 PM       POC Tests:   Recent Labs     11/01/23  0612   POCGLU 127*       Coags: No results found for: \"PROTIME\", \"INR\", \"APTT\"    HCG (If Applicable): No results found for: \"PREGTESTUR\", \"PREGSERUM\", \"HCG\", \"HCGQUANT\"     ABGs: No results found for: \"PHART\", \"PO2ART\", \"ZKT2NHT\", \"XMX3UNX\", \"BEART\", \"E0UCZCVF\"     Type & Screen (If Applicable):  No results found for: \"LABABO\", \"LABRH\"    Drug/Infectious Status (If Applicable):  No results found for: \"HIV\", \"HEPCAB\"    COVID-19 Screening (If Applicable): No results found for: \"COVID19\"        Anesthesia Evaluation  Patient summary reviewed and Nursing notes reviewed no history of anesthetic complications:   Airway: Mallampati: II  TM distance: >3 FB   Neck ROM: full  Mouth opening: > = 3 FB   Dental: normal exam         Pulmonary:Negative Pulmonary ROS and normal exam  breath sounds clear to auscultation      (-) pneumonia, COPD, asthma, shortness of breath, recent URI, sleep apnea, rhonchi, wheezes, rales, stridor, not a current smoker and no decreased breath sounds          Patient did not smoke on day of surgery.                  Cardiovascular:    (+) hypertension: no interval change,     (-) pacemaker, valvular

## 2023-11-01 NOTE — INTERVAL H&P NOTE
Update History & Physical    The patient's History and Physical of October 23, 2023 was reviewed with the patient and I examined the patient. There was no change. The surgical site was confirmed by the patient and me. Gia Moore is 62 y.o.,   Last ECT treatment was 10/30/23 . Patient is being treated for major depressive disorder    Pt tolerated last treatment well. Pt reports that its still too soon to evaluate how  ECT treatments have been . Rating average mood 7/10  with 10 being the best mood. Pt admits to  minimal depression  with low energy. Patient's sleep has been OK . Appetite has been OK . Pt denies any thoughts of suicidal. Pt also denies any thoughts of homicidal. Pt denies auditory/visual hallucinations. Pt reports being compliant with taking all prescribed medications. Pt AAO x 3 in NAD. HRRR. No adventitious lung sounds. No respiratory distress. NPO p MN. Took NO medications this am. Denies recent or current chest pain/pressure, palpitations, SOB, recent URI, fever or chills. Patient denies any changes in medical condition. Patient denies any issues with Anesthesia. Review vitals per RN flowsheet. Plan: The risks, benefits, expected outcome, and alternative to the recommended procedure have been discussed with the patient. Patient understands and wants to proceed with the procedure.      Electronically signed by JOURDAN Costa CNP on 11/1/2023 at 5:44 AM

## 2023-11-01 NOTE — DISCHARGE INSTRUCTIONS
holiday exceptions. Attendance may be in person or virtually. The support group is located in person at [facility], [address] [place], [place], [address], in the basement Board Room. It is available virtually, however you must pre-arrange access by emailing [email or web address]. The support group starts at 4p and ends at 5p. Our support group offers a safe place where all aspects of ECT treatment can be discussed openly. Patients and family/friends are welcome to attend. **      If you feel you are having a problem or complication from your ECT treatments and it is during normal business hours call [phone]. If it is after normal business hours please call [facility] at [phone] to speak with a nurse, or go to your nearest emergency room. If you need to schedule, reschedule or cancel an appointment, please call the [address] [place] [address] at [phone]. You may also use this number for any questions regarding the ECT Support Group. You will need to arrange transportation to and from the hospital for all outpatient ECT treatments. Bring a current list of your medications, including dosages with you to every ECT treatment and arrive 1 hour and 15 minutes before your scheduled ECT time. Sedation or General Anesthesia, Adult  Care After  Refer to this sheet in the next 24 hours. These instructions provide you with information on caring for yourself after your procedure. Your caregiver may also give you more specific instructions. Your treatment has been planned according to current medical practices, but problems sometimes occur. Call your caregiver if you have any problems or questions after your procedure. HOME CARE INSTRUCTIONS   Do not participate in any activities that require you to be alert or coordinated. Do not:  Drive. Swim. Ride a bicycle. Operate heavy machinery. Matthew Sandoval. Use power tools. Climb ladders. Work at [employer].   Take a

## 2023-11-01 NOTE — PROCEDURES
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)  Postprocedure diagnosis: SAME AS PRE-PROCEDURE DIAGNOSIS  Treatment Number: This is treatment # 4   Patient Status: Outpatient   Type of ECT: Bilateral Brief Pulse  Medications  Preprocedure: Tylenol 650mg  Anesthetic: Methohexital 100 mg  Paralytic: Succinylcholine 140 mg  Post procedure: none needed  mg  Cuff placement: Left Lower Extremity    MECTA Settings 1st Stimulus:     Pulse width: 1.0 ms   Frequency:  40 hz   Duration:   2.0 seconds   Static Impedance: 269 ohms             Dynamic Impedance: 197 ohms             Motor Seizure Duration: 38 seconds  EEG Seizure Duration: 44 seconds             The patient's ECT treatment was performed using MECTA machine  . Timeout:  Time out was performed using two patient identifiers and confirmation of procedure. Patient Preparation: Preprocedure documentation, labs, H&P were all verified and reviewed. The patient was placed in supine position. EEG leads were placed for monitoring of seizure. [place] areas bilaterally cleaned and prepped. Conductive gel  was applied over stimulus electrode site. The patient was pre-oxygenated. Procedure in detail: Medications were administered by anesthesia using intravenous access at the doses listed previously in this summary. Anesthetic administered and once confirmation the patient is anesthetized, the patient's blood pressure cuff on lower extremity was inflated to 100mmHg in excess of patient's blood pressure. At this time, the neuromuscular blockade was administered intravenously by anesthesia. Upper extremity fasciculation were verified followed by lower extremity fasciculation. Once fasciculations terminated, confirmation of affective neuromuscular blockade demonstrated by absence of withdrawal reflex.

## 2023-11-01 NOTE — PROGRESS NOTES
Pre ECT Assessment Note  Psychiatry  11/1/2023      [facility] [place]  1965  [id number]      Subjective:     Patient is a 62 y.o.  male seen for an evaluation prior to today's electroconvulsive therapy treatment. Today is treatment number 4, utilizing bilateral.  This is course number 1. The patient has previously      Wife, Roxane Arthur, present at bedside. Patient does not endorse improvement of depression. Denies current suicidal or homicidal ideations. Patient denies memory changes or headaches. Patient reports sleeping at baseline. Appetite is at baseline. Denies visual or auditory hallucinations. Discussed continuing treatments 3x weekly as tolerated. There are no problems to display for this patient. Past Medical History:   Diagnosis Date    Depression     GERD (gastroesophageal reflux disease)     Gout     Hypertension     Migraines     Pre-diabetes       Past Surgical History:   Procedure Laterality Date    APPENDECTOMY  2006    COLONOSCOPY      FINGER AMPUTATION Right     middle, partial      Not in a hospital admission. Allergies   Allergen Reactions    Codeine       Social History     Tobacco Use    Smoking status: Never    Smokeless tobacco: Never   Substance Use Topics    Alcohol use: Yes     Comment: social      History reviewed. No pertinent family history.        Objective:       Mental Status Evaluation:  Appearance:  Wearing gown, on stretcher, fairly groomed   Behavior:  Cooperative, pleasant   Speech:  Normal rate, volume and tone   Mood:  \"Okay\"   Affect:  Congruent. sad   Thought Process:  Linear and organized   Thought Content:  no hallucinations and no delusions   Sensorium:  Does not appear to attend to internal stimuli   Cognition:  Oriented to person, place and general circumstance   Insight:  good   Judgment:  good     Assessment:     Diagnosis: Major depressive disorder, recurrent severe without psychotic features     Plan:     Continue bilateral ECT 3x a weekly  Taper

## 2023-11-02 ENCOUNTER — ANESTHESIA EVENT (OUTPATIENT)
Dept: POSTOP/PACU | Age: 58
End: 2023-11-02
Payer: COMMERCIAL

## 2023-11-03 ENCOUNTER — ANESTHESIA (OUTPATIENT)
Dept: POSTOP/PACU | Age: 58
End: 2023-11-03
Payer: COMMERCIAL

## 2023-11-03 ENCOUNTER — HOSPITAL ENCOUNTER (OUTPATIENT)
Dept: POSTOP/PACU | Age: 58
Discharge: HOME | End: 2023-11-03
Payer: COMMERCIAL

## 2023-11-03 ENCOUNTER — ANESTHESIA EVENT (OUTPATIENT)
Dept: POSTOP/PACU | Age: 58
End: 2023-11-03
Payer: COMMERCIAL

## 2023-11-03 VITALS
OXYGEN SATURATION: 94 % | HEIGHT: 72 IN | BODY MASS INDEX: 26.81 KG/M2 | HEART RATE: 84 BPM | RESPIRATION RATE: 17 BRPM | TEMPERATURE: 97.2 F | WEIGHT: 197.97 LBS | DIASTOLIC BLOOD PRESSURE: 83 MMHG | SYSTOLIC BLOOD PRESSURE: 107 MMHG

## 2023-11-03 DIAGNOSIS — F33.2 SEVERE RECURRENT MAJOR DEPRESSION WITHOUT PSYCHOTIC FEATURES (HCC): Primary | ICD-10-CM

## 2023-11-03 LAB — GLUCOSE BLD-MCNC: 121 MG/DL (ref 75–110)

## 2023-11-03 PROCEDURE — 7100000001 HC PACU RECOVERY - ADDTL 15 MIN: Performed by: ANESTHESIOLOGY

## 2023-11-03 PROCEDURE — 2500000003 HC RX 250 WO HCPCS: Performed by: ANESTHESIOLOGY

## 2023-11-03 PROCEDURE — 82947 ASSAY GLUCOSE BLOOD QUANT: CPT

## 2023-11-03 PROCEDURE — 90870 ELECTROCONVULSIVE THERAPY: CPT | Performed by: ANESTHESIOLOGY

## 2023-11-03 PROCEDURE — 3700000000 HC ANESTHESIA ATTENDED CARE: Performed by: ANESTHESIOLOGY

## 2023-11-03 PROCEDURE — 2580000003 HC RX 258: Performed by: ANESTHESIOLOGY

## 2023-11-03 PROCEDURE — 7100000000 HC PACU RECOVERY - FIRST 15 MIN: Performed by: ANESTHESIOLOGY

## 2023-11-03 PROCEDURE — 3700000001 HC ADD 15 MINUTES (ANESTHESIA): Performed by: ANESTHESIOLOGY

## 2023-11-03 PROCEDURE — 2500000003 HC RX 250 WO HCPCS: Performed by: NURSE ANESTHETIST, CERTIFIED REGISTERED

## 2023-11-03 PROCEDURE — 90870 ELECTROCONVULSIVE THERAPY: CPT | Performed by: PSYCHIATRY & NEUROLOGY

## 2023-11-03 RX ORDER — SODIUM CHLORIDE 9 MG/ML
INJECTION, SOLUTION INTRAVENOUS PRN
Status: DISCONTINUED | OUTPATIENT
Start: 2023-11-03 | End: 2023-11-04 | Stop reason: HOSPADM

## 2023-11-03 RX ORDER — METHOHEXITAL IN WATER/PF 100MG/10ML
SYRINGE (ML) INTRAVENOUS
Status: COMPLETED
Start: 2023-11-03 | End: 2023-11-03

## 2023-11-03 RX ORDER — SUCCINYLCHOLINE/SOD CL,ISO/PF 200MG/10ML
SYRINGE (ML) INTRAVENOUS PRN
Status: DISCONTINUED | OUTPATIENT
Start: 2023-11-03 | End: 2023-11-03 | Stop reason: SDUPTHER

## 2023-11-03 RX ORDER — SODIUM CHLORIDE 0.9 % (FLUSH) 0.9 %
5-40 SYRINGE (ML) INJECTION PRN
Status: DISCONTINUED | OUTPATIENT
Start: 2023-11-03 | End: 2023-11-04 | Stop reason: HOSPADM

## 2023-11-03 RX ORDER — SUCCINYLCHOLINE/SOD CL,ISO/PF 200MG/10ML
SYRINGE (ML) INTRAVENOUS
Status: COMPLETED
Start: 2023-11-03 | End: 2023-11-03

## 2023-11-03 RX ORDER — METHOHEXITAL IN WATER/PF 100MG/10ML
SYRINGE (ML) INTRAVENOUS PRN
Status: DISCONTINUED | OUTPATIENT
Start: 2023-11-03 | End: 2023-11-03 | Stop reason: SDUPTHER

## 2023-11-03 RX ORDER — SODIUM CHLORIDE 0.9 % (FLUSH) 0.9 %
5-40 SYRINGE (ML) INJECTION EVERY 12 HOURS SCHEDULED
Status: DISCONTINUED | OUTPATIENT
Start: 2023-11-03 | End: 2023-11-04 | Stop reason: HOSPADM

## 2023-11-03 RX ORDER — SODIUM CHLORIDE, SODIUM LACTATE, POTASSIUM CHLORIDE, CALCIUM CHLORIDE 600; 310; 30; 20 MG/100ML; MG/100ML; MG/100ML; MG/100ML
INJECTION, SOLUTION INTRAVENOUS CONTINUOUS
Status: DISCONTINUED | OUTPATIENT
Start: 2023-11-03 | End: 2023-11-04 | Stop reason: HOSPADM

## 2023-11-03 RX ORDER — LIDOCAINE HYDROCHLORIDE 10 MG/ML
1 INJECTION, SOLUTION EPIDURAL; INFILTRATION; INTRACAUDAL; PERINEURAL
Status: COMPLETED | OUTPATIENT
Start: 2023-11-03 | End: 2023-11-03

## 2023-11-03 RX ADMIN — Medication 100 MG: at 07:17

## 2023-11-03 RX ADMIN — Medication 140 MG: at 07:18

## 2023-11-03 RX ADMIN — LIDOCAINE HYDROCHLORIDE 1 ML: 10 INJECTION, SOLUTION EPIDURAL; INFILTRATION; INTRACAUDAL; PERINEURAL at 06:17

## 2023-11-03 RX ADMIN — SODIUM CHLORIDE, POTASSIUM CHLORIDE, SODIUM LACTATE AND CALCIUM CHLORIDE: 600; 310; 30; 20 INJECTION, SOLUTION INTRAVENOUS at 06:16

## 2023-11-03 ASSESSMENT — PAIN - FUNCTIONAL ASSESSMENT: PAIN_FUNCTIONAL_ASSESSMENT: 0-10

## 2023-11-03 NOTE — INTERVAL H&P NOTE
Update History & Physical    The patient's History and Physical of October 27, 2023 was reviewed with the patient and I examined the patient. There was no change. The surgical site was confirmed by the patient and me. Sandor Mason is 62 y.o.,  male, here for ECT treatment. Patient is being treated for major depressive disorder   Last ECT treatment was 11/1/23 . Pt tolerated last treatment well. Pt reports ECT treatments have been effective at the current frequency. Rating average mood 8/10  with 10 being the best mood. Pt admits to  Minimal depression  with low energy. Patient's sleep has been good . Appetite has been fine . Pt denies any thoughts of suicidal. Pt also denies any thoughts of homicidal. Pt denies auditory/visual hallucinations. Pt reports being compliant with taking all prescribed medications. Pt AAO x 3 in NAD. HRRR. No adventitious lung sounds. No respiratory distress. NPO p MN. Took NO medications this am. Denies recent or current chest pain/pressure, palpitations, SOB, recent URI, fever or chills. Patient denies any changes in medical condition. Patient denies any issues with Anesthesia. Review vitals per RN flowsheet. Plan: The risks, benefits, expected outcome, and alternative to the recommended procedure have been discussed with the patient. Patient understands and wants to proceed with the procedure.      Electronically signed by JOURDAN Tapia CNP on 11/3/2023 at 5:48 AM

## 2023-11-03 NOTE — PROGRESS NOTES
Lab called requesting a gold top tube for add-on labs. RN obtained and sent specimen.    Pre ECT Assessment Note  Psychiatry  11/3/2023      Christy Earl  1965  [id number]      Subjective:     Patient is a 62 y.o.  male seen for an evaluation prior to today's electroconvulsive therapy treatment. Today is treatment number 5, utilizing bilateral.  This is course number 1. The patient has never previously completed a course of ECT treatment. Wife, Becky Acosta, present at bedside. Patient and wife have noticed slight improvement in patients depression. Denies suicidal or homicidal ideations. Patient endorses short term memory changes after treatment that resides the same day. Patient denies headaches. Sleeping and appetite at baseline. Denies visual or auditory hallucinations. There are no problems to display for this patient. Past Medical History:   Diagnosis Date    Depression     GERD (gastroesophageal reflux disease)     Gout     Hypertension     Migraines     Pre-diabetes       Past Surgical History:   Procedure Laterality Date    APPENDECTOMY  2006    COLONOSCOPY      FINGER AMPUTATION Right     middle, partial      Not in a hospital admission. Allergies   Allergen Reactions    Codeine       Social History     Tobacco Use    Smoking status: Never    Smokeless tobacco: Never   Substance Use Topics    Alcohol use: Yes     Comment: social      History reviewed. No pertinent family history.        Objective:       Mental Status Evaluation:  Appearance:  Wearing gown, on stretcher, well groomed   Behavior:  Cooperative, present   Speech:  Normal rate, volume and tone   Mood:  depressed   Affect:  Congruent   Thought Process:  Linear and organized   Thought Content:  no hallucinations and no delusions   Sensorium:  Does not appear to attend to internal stimuli   Cognition:  Oriented to person, place and general circumstance   Insight:  good   Judgment:  good     Assessment:     Diagnosis: Major depressive disorder, recurrent severe without psychotic features     Plan:     Continue

## 2023-11-03 NOTE — PROGRESS NOTES
Pre ECT Assessment Note  Psychiatry  11/3/2023      Alise Jones  1965  [id number]      Subjective:     Patient is a 62 y.o.  male seen for an evaluation prior to today's electroconvulsive therapy treatment. Today is treatment number 5, utilizing bilateral.  This is course number 1. The patient has never previously completed a course of ECT treatment. Wife, Alcides Renteria, present at bedside. Patient and wife have noticed slight improvement in patients depression. Denies suicidal or homicidal ideations. Patient endorses short term memory changes after treatment that resides the same day. Patient denies headaches. Sleeping and appetite at baseline. Denies visual or auditory hallucinations. There are no problems to display for this patient. Past Medical History:   Diagnosis Date    Depression     GERD (gastroesophageal reflux disease)     Gout     Hypertension     Migraines     Pre-diabetes       Past Surgical History:   Procedure Laterality Date    APPENDECTOMY  2006    COLONOSCOPY      FINGER AMPUTATION Right     middle, partial      Not in a hospital admission. Allergies   Allergen Reactions    Codeine       Social History     Tobacco Use    Smoking status: Never    Smokeless tobacco: Never   Substance Use Topics    Alcohol use: Yes     Comment: social      History reviewed. No pertinent family history.        Objective:       Mental Status Evaluation:  Appearance:  Wearing gown, on stretcher, well groomed   Behavior:  Cooperative, present   Speech:  Normal rate, volume and tone   Mood:  depressed   Affect:  Congruent   Thought Process:  Linear and organized   Thought Content:  no hallucinations and no delusions   Sensorium:  Does not appear to attend to internal stimuli   Cognition:  Oriented to person, place and general circumstance   Insight:  good   Judgment:  good     Assessment:     Diagnosis: Major depressive disorder, recurrent severe without psychotic features     Plan:     Continue

## 2023-11-03 NOTE — PROCEDURES
Bilateral ECT Procedure Report  Zohreh Weaver   11/3/2023  1965         Attending:Antoni Carr MD  Preprocedure diagnosis: Major depressive disorder, recurrent severe without psychotic features (F33.2)  Postprocedure diagnosis: SAME AS PRE-PROCEDURE DIAGNOSIS  Treatment Number: This is treatment # 5   Patient Status: Outpatient   Type of ECT: Bilateral Brief Pulse  Medications  Preprocedure: Tylenol 650mg  Anesthetic: Methohexital 100 mg  Paralytic: Succinylcholine 140 mg  Post procedure: none needed  mg  Cuff placement: Left Lower Extremity    MECTA Settings 1st Stimulus:     Pulse width: 1.0 ms   Frequency:  40 hz   Duration:   2.0 seconds   Static Impedance: 258 ohms             Dynamic Impedance: 188 ohms             Motor Seizure Duration: 22 seconds  EEG Seizure Duration: 24 seconds             The patient's ECT treatment was performed using MECTA machine  . Timeout:  Time out was performed using two patient identifiers and confirmation of procedure. Patient Preparation: Preprocedure documentation, labs, H&P were all verified and reviewed. The patient was placed in supine position. EEG leads were placed for monitoring of seizure. [place] areas bilaterally cleaned and prepped. Conductive gel  was applied over stimulus electrode site. The patient was pre-oxygenated. Procedure in detail: Medications were administered by anesthesia using intravenous access at the doses listed previously in this summary. Anesthetic administered and once confirmation the patient is anesthetized, the patient's blood pressure cuff on lower extremity was inflated to 100mmHg in excess of patient's blood pressure. At this time, the neuromuscular blockade was administered intravenously by anesthesia. Upper extremity fasciculation were verified followed by lower extremity fasciculation. Once fasciculations terminated, confirmation of affective neuromuscular blockade demonstrated by absence of withdrawal reflex.

## 2023-11-03 NOTE — DISCHARGE INSTRUCTIONS
complication from your ECT treatments and it is during normal business hours call, ECT Coordinator at [phone]. If it is after normal business hours please call [facility] at [phone] to speak with a nurse, or go to your nearest emergency room. If you need to schedule, reschedule or cancel an appointment, please call  ECT Coordinator at [phone]. You will need to arrange transportation to and from the hospital for all outpatient ECT treatments. Bring a current list of your medications, including dosages with you to every ECT treatment and arrive 1 hour and 15 minutes before your scheduled ECT time.

## 2023-11-06 ENCOUNTER — HOSPITAL ENCOUNTER (OUTPATIENT)
Dept: POSTOP/PACU | Age: 58
Discharge: HOME | End: 2023-11-06
Payer: COMMERCIAL

## 2023-11-06 ENCOUNTER — ANESTHESIA (OUTPATIENT)
Dept: POSTOP/PACU | Age: 58
End: 2023-11-06
Payer: COMMERCIAL

## 2023-11-06 VITALS
TEMPERATURE: 97.5 F | SYSTOLIC BLOOD PRESSURE: 140 MMHG | DIASTOLIC BLOOD PRESSURE: 90 MMHG | BODY MASS INDEX: 26.68 KG/M2 | WEIGHT: 197 LBS | RESPIRATION RATE: 12 BRPM | OXYGEN SATURATION: 95 % | HEIGHT: 72 IN | HEART RATE: 89 BPM

## 2023-11-06 LAB — GLUCOSE BLD-MCNC: 137 MG/DL (ref 75–110)

## 2023-11-06 PROCEDURE — 3700000000 HC ANESTHESIA ATTENDED CARE: Performed by: ANESTHESIOLOGY

## 2023-11-06 PROCEDURE — 7100000000 HC PACU RECOVERY - FIRST 15 MIN: Performed by: ANESTHESIOLOGY

## 2023-11-06 PROCEDURE — 7100000001 HC PACU RECOVERY - ADDTL 15 MIN: Performed by: ANESTHESIOLOGY

## 2023-11-06 PROCEDURE — 2580000003 HC RX 258: Performed by: ANESTHESIOLOGY

## 2023-11-06 PROCEDURE — 90870 ELECTROCONVULSIVE THERAPY: CPT | Performed by: ANESTHESIOLOGY

## 2023-11-06 PROCEDURE — 82947 ASSAY GLUCOSE BLOOD QUANT: CPT

## 2023-11-06 PROCEDURE — 3700000001 HC ADD 15 MINUTES (ANESTHESIA): Performed by: ANESTHESIOLOGY

## 2023-11-06 PROCEDURE — 2500000003 HC RX 250 WO HCPCS

## 2023-11-06 PROCEDURE — 90870 ELECTROCONVULSIVE THERAPY: CPT | Performed by: PSYCHIATRY & NEUROLOGY

## 2023-11-06 RX ORDER — SODIUM CHLORIDE, SODIUM LACTATE, POTASSIUM CHLORIDE, CALCIUM CHLORIDE 600; 310; 30; 20 MG/100ML; MG/100ML; MG/100ML; MG/100ML
INJECTION, SOLUTION INTRAVENOUS CONTINUOUS
Status: DISCONTINUED | OUTPATIENT
Start: 2023-11-06 | End: 2023-11-07 | Stop reason: HOSPADM

## 2023-11-06 RX ORDER — SUCCINYLCHOLINE/SOD CL,ISO/PF 200MG/10ML
SYRINGE (ML) INTRAVENOUS PRN
Status: DISCONTINUED | OUTPATIENT
Start: 2023-11-06 | End: 2023-11-06 | Stop reason: SDUPTHER

## 2023-11-06 RX ORDER — METHOHEXITAL IN WATER/PF 100MG/10ML
SYRINGE (ML) INTRAVENOUS
Status: COMPLETED
Start: 2023-11-06 | End: 2023-11-06

## 2023-11-06 RX ORDER — METHOHEXITAL IN WATER/PF 100MG/10ML
SYRINGE (ML) INTRAVENOUS PRN
Status: DISCONTINUED | OUTPATIENT
Start: 2023-11-06 | End: 2023-11-06 | Stop reason: SDUPTHER

## 2023-11-06 RX ORDER — SODIUM CHLORIDE 9 MG/ML
INJECTION, SOLUTION INTRAVENOUS PRN
Status: DISCONTINUED | OUTPATIENT
Start: 2023-11-06 | End: 2023-11-07 | Stop reason: HOSPADM

## 2023-11-06 RX ORDER — LIDOCAINE HYDROCHLORIDE 10 MG/ML
1 INJECTION, SOLUTION EPIDURAL; INFILTRATION; INTRACAUDAL; PERINEURAL
Status: DISCONTINUED | OUTPATIENT
Start: 2023-11-06 | End: 2023-11-07 | Stop reason: HOSPADM

## 2023-11-06 RX ORDER — SODIUM CHLORIDE 0.9 % (FLUSH) 0.9 %
5-40 SYRINGE (ML) INJECTION EVERY 12 HOURS SCHEDULED
Status: DISCONTINUED | OUTPATIENT
Start: 2023-11-06 | End: 2023-11-07 | Stop reason: HOSPADM

## 2023-11-06 RX ORDER — SODIUM CHLORIDE 0.9 % (FLUSH) 0.9 %
5-40 SYRINGE (ML) INJECTION PRN
Status: DISCONTINUED | OUTPATIENT
Start: 2023-11-06 | End: 2023-11-07 | Stop reason: HOSPADM

## 2023-11-06 RX ORDER — SUCCINYLCHOLINE/SOD CL,ISO/PF 200MG/10ML
SYRINGE (ML) INTRAVENOUS
Status: COMPLETED
Start: 2023-11-06 | End: 2023-11-06

## 2023-11-06 RX ADMIN — SODIUM CHLORIDE, POTASSIUM CHLORIDE, SODIUM LACTATE AND CALCIUM CHLORIDE: 600; 310; 30; 20 INJECTION, SOLUTION INTRAVENOUS at 06:15

## 2023-11-06 RX ADMIN — Medication 140 MG: at 07:05

## 2023-11-06 RX ADMIN — Medication 100 MG: at 07:05

## 2023-11-06 ASSESSMENT — PAIN SCALES - GENERAL: PAINLEVEL_OUTOF10: 0

## 2023-11-06 ASSESSMENT — PAIN - FUNCTIONAL ASSESSMENT: PAIN_FUNCTIONAL_ASSESSMENT: 0-10

## 2023-11-06 NOTE — INTERVAL H&P NOTE
Update History & Physical    The patient's History and Physical of October 27, 2023 was reviewed with the patient and I examined the patient. There was no change. The surgical site was confirmed by the patient and me. Smith Fischer is 62 y.o.,  male, here for ECT treatment. Patient is being treated for major depressive disorder   Last ECT treatment was 11/3/23 . Pt tolerated last treatment well. Pt reports ECT treatments have been effective at the current frequency. \" I feel a little bit clearer\"   Rating average mood 7/10  with 10 being the best mood. Pt admits to  Minimal  depression  with low energy. Patient's sleep has been good . Appetite has been fine . Pt denies any thoughts of suicidal. Pt also denies any thoughts of homicidal. Pt denies auditory/visual hallucinations. Pt reports being compliant with taking all prescribed medications. Pt AAO x 3 in NAD. HRRR. No adventitious lung sounds. No respiratory distress. NPO p MN. Took NO medications this am. Denies recent or current chest pain/pressure, palpitations, SOB, recent URI, fever or chills. Patient denies any changes in medical condition. Patient denies any issues with Anesthesia. Review vitals per RN flowsheet. Plan: The risks, benefits, expected outcome, and alternative to the recommended procedure have been discussed with the patient. Patient understands and wants to proceed with the procedure.      Electronically signed by JOURDAN Gracia CNP on 11/6/2023 at 5:41 AM

## 2023-11-06 NOTE — PROCEDURES
Bilateral ECT Procedure Report  Annetta Feng   11/6/2023 1965         Attending:Antoni Carr MD  Preprocedure diagnosis: Major depressive disorder, recurrent severe without psychotic features (F33.2)  Postprocedure diagnosis: SAME AS PRE-PROCEDURE DIAGNOSIS  Treatment Number: This is treatment # 6   Patient Status: Outpatient   Type of ECT: Bilateral Brief Pulse  Medications  Preprocedure: Tylenol 650mg  Anesthetic: Methohexital 100 mg  Paralytic: Succinylcholine 140 mg  Post procedure: none needed  mg  Cuff placement: Left Lower Extremity    MECTA Settings 1st Stimulus:     Pulse width: 1.0 ms   Frequency:  40 hz   Duration:   2.0 seconds   Static Impedance: 177 ohms             Dynamic Impedance: 171 ohms             Motor Seizure Duration: 41 seconds  EEG Seizure Duration: 75 seconds             The patient's ECT treatment was performed using MECTA machine  . Timeout:  Time out was performed using two patient identifiers and confirmation of procedure. Patient Preparation: Preprocedure documentation, labs, H&P were all verified and reviewed. The patient was placed in supine position. EEG leads were placed for monitoring of seizure. [place] areas bilaterally cleaned and prepped. Conductive gel  was applied over stimulus electrode site. The patient was pre-oxygenated. Procedure in detail: Medications were administered by anesthesia using intravenous access at the doses listed previously in this summary. Anesthetic administered and once confirmation the patient is anesthetized, the patient's blood pressure cuff on lower extremity was inflated to 100mmHg in excess of patient's blood pressure. At this time, the neuromuscular blockade was administered intravenously by anesthesia. Upper extremity fasciculation were verified followed by lower extremity fasciculation. Once fasciculations terminated, confirmation of affective neuromuscular blockade demonstrated by absence of withdrawal reflex.

## 2023-11-06 NOTE — DISCHARGE INSTRUCTIONS
Sedation or General Anesthesia, Adult  Care After  Refer to this sheet in the next 24 hours. These instructions provide you with information on caring for yourself after your procedure. Your caregiver may also give you more specific instructions. Your treatment has been planned according to current medical practices, but problems sometimes occur. Call your caregiver if you have any problems or questions after your procedure. HOME CARE INSTRUCTIONS   Do not participate in any activities that require you to be alert or coordinated. Do not:  Drive. Swim. Ride a bicycle. Operate heavy machinery. [employer]. Use power tools. Climb ladders. Work at [place]. Take a bath. Do not drink alcohol. Do not make any important decisions or sign legal documents. Stay with an adult. The first meal following your procedure should be light and small. Avoid solid foods if you feel sick to your stomach (nauseous) or if you throw up (vomit). Drink enough fluids to keep your urine clear or pale yellow. Only take your usual medicines or new medicines if your caregiver approves them. Only take over-the-counter or prescription medicines for pain, discomfort, or fever as directed by your caregiver. Keep all follow-up appointments as directed by your caregiver. SEEK IMMEDIATE MEDICAL CARE IF:   You are not feeling normal or behaving normally after 24 hours. You have persistent nausea and vomiting. You are unable to drink fluids or eat food. You have difficulty urinating. You have difficulty breathing or speaking. You have blue or gray skin. There is difficulty waking or you cannot be woken up. You have heavy bleeding, redness, or a lot of swelling where the sedative or anesthesia entered your skin (intravenous site). You have a rash. MAKE SURE YOU:  Understand these instructions. Will watch your condition. Will get help right away if you are not doing well or get worse.   Document Released: 12/18/2006 Document Revised:

## 2023-11-06 NOTE — PROGRESS NOTES
Pre ECT Assessment Note  Psychiatry  11/6/2023      Hallie Bowen  1965  [id number]      Subjective:     Patient is a 62 y.o.  male seen for an evaluation prior to today's electroconvulsive therapy treatment. Today is treatment number 6, utilizing bilateral.  This is course number 1. The patient has never previously completed a course of ECT treatment. Wife, Obey Camacho, present at bedside. She has not appreciated any significant improvement in his mood. He continues to struggle with poor motivation/energy. Jeanie Laboy reports that he is feeling \"a little better\" though continues to endorse depression. He remains safe and able to contract for safety in community. We mutually agree to continue treatment 3x this week monitoring for improvement in symptoms. There are no problems to display for this patient. Past Medical History:   Diagnosis Date    Depression     GERD (gastroesophageal reflux disease)     Gout     Hypertension     Migraines     Pre-diabetes       Past Surgical History:   Procedure Laterality Date    APPENDECTOMY  2006    COLONOSCOPY      FINGER AMPUTATION Right     middle, partial      Not in a hospital admission. Allergies   Allergen Reactions    Codeine       Social History     Tobacco Use    Smoking status: Never    Smokeless tobacco: Never   Substance Use Topics    Alcohol use: Yes     Comment: social      History reviewed. No pertinent family history.        Objective:       Mental Status Evaluation:  Appearance:  Wearing gown, on stretcher, well groomed   Behavior:  Cooperative, present   Speech:  Normal rate, volume and tone   Mood:  depressed   Affect:  Congruent   Thought Process:  Linear and organized   Thought Content:  no hallucinations and no delusions   Sensorium:  Does not appear to attend to internal stimuli   Cognition:  Oriented to person, place and general circumstance   Insight:  good   Judgment:  good     Assessment:     Diagnosis: Major depressive disorder, recurrent

## 2023-11-07 ENCOUNTER — ANESTHESIA EVENT (OUTPATIENT)
Dept: POSTOP/PACU | Age: 58
End: 2023-11-07
Payer: COMMERCIAL

## 2023-11-08 ENCOUNTER — HOSPITAL ENCOUNTER (OUTPATIENT)
Dept: POSTOP/PACU | Age: 58
Discharge: HOME | End: 2023-11-08
Payer: COMMERCIAL

## 2023-11-08 ENCOUNTER — ANESTHESIA (OUTPATIENT)
Dept: POSTOP/PACU | Age: 58
End: 2023-11-08
Payer: COMMERCIAL

## 2023-11-08 VITALS
OXYGEN SATURATION: 92 % | HEART RATE: 91 BPM | BODY MASS INDEX: 26.68 KG/M2 | TEMPERATURE: 97.3 F | WEIGHT: 197 LBS | RESPIRATION RATE: 14 BRPM | HEIGHT: 72 IN | DIASTOLIC BLOOD PRESSURE: 91 MMHG | SYSTOLIC BLOOD PRESSURE: 127 MMHG

## 2023-11-08 DIAGNOSIS — F33.2 SEVERE RECURRENT MAJOR DEPRESSION WITHOUT PSYCHOTIC FEATURES (HCC): Primary | ICD-10-CM

## 2023-11-08 LAB — GLUCOSE BLD-MCNC: 118 MG/DL (ref 75–110)

## 2023-11-08 PROCEDURE — 7100000000 HC PACU RECOVERY - FIRST 15 MIN: Performed by: ANESTHESIOLOGY

## 2023-11-08 PROCEDURE — 7100000001 HC PACU RECOVERY - ADDTL 15 MIN: Performed by: ANESTHESIOLOGY

## 2023-11-08 PROCEDURE — 3700000000 HC ANESTHESIA ATTENDED CARE: Performed by: ANESTHESIOLOGY

## 2023-11-08 PROCEDURE — 90870 ELECTROCONVULSIVE THERAPY: CPT | Performed by: PSYCHIATRY & NEUROLOGY

## 2023-11-08 PROCEDURE — 2580000003 HC RX 258: Performed by: ANESTHESIOLOGY

## 2023-11-08 PROCEDURE — 3700000001 HC ADD 15 MINUTES (ANESTHESIA): Performed by: ANESTHESIOLOGY

## 2023-11-08 PROCEDURE — 90870 ELECTROCONVULSIVE THERAPY: CPT

## 2023-11-08 PROCEDURE — 2500000003 HC RX 250 WO HCPCS: Performed by: NURSE ANESTHETIST, CERTIFIED REGISTERED

## 2023-11-08 PROCEDURE — 82947 ASSAY GLUCOSE BLOOD QUANT: CPT

## 2023-11-08 RX ORDER — SODIUM CHLORIDE 9 MG/ML
INJECTION, SOLUTION INTRAVENOUS PRN
Status: DISCONTINUED | OUTPATIENT
Start: 2023-11-08 | End: 2023-11-09 | Stop reason: HOSPADM

## 2023-11-08 RX ORDER — METHOHEXITAL IN WATER/PF 100MG/10ML
SYRINGE (ML) INTRAVENOUS
Status: COMPLETED
Start: 2023-11-08 | End: 2023-11-08

## 2023-11-08 RX ORDER — SODIUM CHLORIDE 0.9 % (FLUSH) 0.9 %
5-40 SYRINGE (ML) INJECTION EVERY 12 HOURS SCHEDULED
Status: DISCONTINUED | OUTPATIENT
Start: 2023-11-08 | End: 2023-11-09 | Stop reason: HOSPADM

## 2023-11-08 RX ORDER — LIDOCAINE HYDROCHLORIDE 10 MG/ML
1 INJECTION, SOLUTION EPIDURAL; INFILTRATION; INTRACAUDAL; PERINEURAL
Status: DISCONTINUED | OUTPATIENT
Start: 2023-11-08 | End: 2023-11-09 | Stop reason: HOSPADM

## 2023-11-08 RX ORDER — SODIUM CHLORIDE, SODIUM LACTATE, POTASSIUM CHLORIDE, CALCIUM CHLORIDE 600; 310; 30; 20 MG/100ML; MG/100ML; MG/100ML; MG/100ML
INJECTION, SOLUTION INTRAVENOUS CONTINUOUS
Status: DISCONTINUED | OUTPATIENT
Start: 2023-11-08 | End: 2023-11-09 | Stop reason: HOSPADM

## 2023-11-08 RX ORDER — METHOHEXITAL IN WATER/PF 100MG/10ML
SYRINGE (ML) INTRAVENOUS PRN
Status: DISCONTINUED | OUTPATIENT
Start: 2023-11-08 | End: 2023-11-08 | Stop reason: SDUPTHER

## 2023-11-08 RX ORDER — SUCCINYLCHOLINE/SOD CL,ISO/PF 200MG/10ML
SYRINGE (ML) INTRAVENOUS PRN
Status: DISCONTINUED | OUTPATIENT
Start: 2023-11-08 | End: 2023-11-08 | Stop reason: SDUPTHER

## 2023-11-08 RX ORDER — SUCCINYLCHOLINE/SOD CL,ISO/PF 200MG/10ML
SYRINGE (ML) INTRAVENOUS
Status: COMPLETED
Start: 2023-11-08 | End: 2023-11-08

## 2023-11-08 RX ORDER — SODIUM CHLORIDE 0.9 % (FLUSH) 0.9 %
5-40 SYRINGE (ML) INJECTION PRN
Status: DISCONTINUED | OUTPATIENT
Start: 2023-11-08 | End: 2023-11-09 | Stop reason: HOSPADM

## 2023-11-08 RX ADMIN — SODIUM CHLORIDE, POTASSIUM CHLORIDE, SODIUM LACTATE AND CALCIUM CHLORIDE: 600; 310; 30; 20 INJECTION, SOLUTION INTRAVENOUS at 06:29

## 2023-11-08 RX ADMIN — Medication 100 MG: at 07:10

## 2023-11-08 RX ADMIN — Medication 140 MG: at 07:10

## 2023-11-08 ASSESSMENT — ENCOUNTER SYMPTOMS
STRIDOR: 0
SHORTNESS OF BREATH: 0

## 2023-11-08 ASSESSMENT — PAIN - FUNCTIONAL ASSESSMENT: PAIN_FUNCTIONAL_ASSESSMENT: 0-10

## 2023-11-08 ASSESSMENT — LIFESTYLE VARIABLES: SMOKING_STATUS: 0

## 2023-11-08 NOTE — PROCEDURES
Bilateral ECT Procedure Report  Renetta Christianson   11/8/2023 1965         Attending:Antoni Carr MD  Preprocedure diagnosis: Major depressive disorder, recurrent severe without psychotic features (F33.2)  Postprocedure diagnosis: SAME AS PRE-PROCEDURE DIAGNOSIS  Treatment Number: This is treatment # 7   Patient Status: Outpatient   Type of ECT: Bilateral Brief Pulse  Medications  Preprocedure: Tylenol 650mg  Anesthetic: Methohexital 100 mg  Paralytic: Succinylcholine 140 mg  Post procedure: none needed  mg  Cuff placement: Left Lower Extremity    MECTA Settings 1st Stimulus:     Pulse width: 1.0 ms   Frequency:  40 hz   Duration:   2.0 seconds   Static Impedance: 179 ohms             Dynamic Impedance: 180 ohms             Motor Seizure Duration: 43 seconds  EEG Seizure Duration: 52 seconds             The patient's ECT treatment was performed using MECTA machine  . Timeout:  Time out was performed using two patient identifiers and confirmation of procedure. Patient Preparation: Preprocedure documentation, labs, H&P were all verified and reviewed. The patient was placed in supine position. EEG leads were placed for monitoring of seizure. [place] areas bilaterally cleaned and prepped. Conductive gel  was applied over stimulus electrode site. The patient was pre-oxygenated. Procedure in detail: Medications were administered by anesthesia using intravenous access at the doses listed previously in this summary. Anesthetic administered and once confirmation the patient is anesthetized, the patient's blood pressure cuff on lower extremity was inflated to 100mmHg in excess of patient's blood pressure. At this time, the neuromuscular blockade was administered intravenously by anesthesia. Upper extremity fasciculation were verified followed by lower extremity fasciculation. Once fasciculations terminated, confirmation of affective neuromuscular blockade demonstrated by absence of withdrawal reflex.

## 2023-11-08 NOTE — DISCHARGE INSTRUCTIONS
ELECTROCONVULSIVE THERAPY DISCHARGE INSTRUCTIONS         1. Activity - Rest today. The anesthetic agents you have received can make you feel drowsy or tired. A responsible person must drive you home and stay with you for 8 hours after discharge from the [address] [place] [address] not drive a motor vehicle or operate any machinery for 24 hours. .   *Do not make any complex decisions or execute any legal documents until 3 weeks AFTER your last treatment. If you have any questions regarding this, speak with your psychiatrist first.*    2. Diet - Nothing to eat or drink after midnight the night of your ECT treatment. After ECT, start with clear liquids, if you can drink without coughing, you may proceed with gradually progressing to your usual diet. No alcoholic beverages for 24 hours. 3. Medications - Take your daily medications as prescribed, after you return home from today's ECT. If you take a Beta Blocker or have been prescribed Imitrex, you may be instructed to take these medications the morning of ECT. If you are unsure please ask the physician. Take with these medication the morning of ECT with one Tablespoon of water. Tylenol 650 mg  All blood pressure medications  ***    4. You may experience the following after your treatment:   a. Poor memory   b. Poor balance   c. Headaches   d. Confusion   e. Muscle soreness   f. Nausea      5. Special Precautions - Contact you physician immediately if these occur:   a. Signs of infection: redness, swelling, heat, red streaks at the site of the IV   b. Excessive pain unrelieved by prescription pain medications   c. Nausea and vomiting that persists beyond the first day after your procedure    6. Next Procedure Date:   Your next ECT treatment is scheduled for 700 am on November 10th. Please arrive to the hospital by 530 am that morning. **You are also invited to join us at our monthly Electroconvulsive Therapy Peer Support Group.   This support group is held once a

## 2023-11-08 NOTE — INTERVAL H&P NOTE
Update History & Physical    The patient's History and Physical of October 27, 2023 was reviewed with the patient and I examined the patient. There was no change. The surgical site was confirmed by the patient and me. Yasemin Garnica is 62 y.o.,  male, here for ECT treatment. Patient is being treated for major depressive disorder   Last ECT treatment was 11/6/23 . Pt tolerated last treatment well. Pt reports ECT treatments have been effective at the current frequency. \" Its getting clearer\"   Rating average mood 7/10  with 10 being the best mood. Pt admits to  minimal depression  with low energy. Patient's sleep has been good . Appetite has been fine . Pt denies any thoughts of suicidal. Pt also denies any thoughts of homicidal. Pt denies auditory/visual hallucinations. Pt reports being compliant with taking all prescribed medications. Pt AAO x 3 in NAD. HRRR. No adventitious lung sounds. No respiratory distress. NPO p MN. Took NO medications this am. Denies recent or current chest pain/pressure, palpitations, SOB, recent URI, fever or chills. Patient denies any changes in medical condition. Patient denies any issues with Anesthesia. Review vitals per RN flowsheet. Plan: The risks, benefits, expected outcome, and alternative to the recommended procedure have been discussed with the patient. Patient understands and wants to proceed with the procedure.      Electronically signed by Wynelle Krabbe, APRN - CNP on 11/8/2023 at 5:55 AM

## 2023-11-08 NOTE — PROGRESS NOTES
Pre ECT Assessment Note  Psychiatry  11/8/2023      Olivier Borrero  1965  [id number]      Subjective:     Patient is a 62 y.o.  male seen for an evaluation prior to today's electroconvulsive therapy treatment. Today is treatment number 7, utilizing bilateral.  This is course number 1. The patient has never previously not completed treatment. Patient's wife, Gaurav Churchill, was present at bedside. Both patient and wife report mild improvement in mood. Patient reports that his side effects are nausea and some memory loss. He denies any suicidal and homicidal ideations as well as auditory and visual hallucinations. Patient denies any changes to his appetite and sleep. There are no problems to display for this patient. Past Medical History:   Diagnosis Date    Depression     GERD (gastroesophageal reflux disease)     Gout     Hypertension     Migraines     Pre-diabetes       Past Surgical History:   Procedure Laterality Date    APPENDECTOMY  2006    COLONOSCOPY      FINGER AMPUTATION Right     middle, partial      Not in a hospital admission. Allergies   Allergen Reactions    Codeine       Social History     Tobacco Use    Smoking status: Never    Smokeless tobacco: Never   Substance Use Topics    Alcohol use: Yes     Comment: social      History reviewed. No pertinent family history. Objective:       Mental Status Evaluation:  Appearance:  Wearing gown, on stretcher, fairly groomed   Behavior:  Cooperative   Speech:  Normal rate, volume and tone   Mood:  euthymic   Affect:  Congruent   Thought Process:  Linear and organized   Thought Content:  no hallucinations and no delusions   Sensorium:  Does not appear to attend to internal stimuli   Cognition:  Oriented to person, place and general circumstance   Insight:  good   Judgment:  good     Assessment:     Diagnosis: Major depressive disorder, recurrent severe without psychotic features     Plan:     Continue ECT as tolerated.     Update consent and

## 2023-11-09 ENCOUNTER — ANESTHESIA EVENT (OUTPATIENT)
Dept: POSTOP/PACU | Age: 58
End: 2023-11-09
Payer: COMMERCIAL

## 2023-11-10 ENCOUNTER — ANESTHESIA (OUTPATIENT)
Dept: POSTOP/PACU | Age: 58
End: 2023-11-10
Payer: COMMERCIAL

## 2023-11-10 ENCOUNTER — HOSPITAL ENCOUNTER (OUTPATIENT)
Dept: POSTOP/PACU | Age: 58
Discharge: HOME | End: 2023-11-10
Payer: COMMERCIAL

## 2023-11-10 ENCOUNTER — ANESTHESIA EVENT (OUTPATIENT)
Dept: POSTOP/PACU | Age: 58
End: 2023-11-10
Payer: COMMERCIAL

## 2023-11-10 VITALS
WEIGHT: 197 LBS | TEMPERATURE: 97.5 F | HEART RATE: 90 BPM | RESPIRATION RATE: 18 BRPM | DIASTOLIC BLOOD PRESSURE: 92 MMHG | SYSTOLIC BLOOD PRESSURE: 124 MMHG | OXYGEN SATURATION: 96 % | HEIGHT: 72 IN | BODY MASS INDEX: 26.68 KG/M2

## 2023-11-10 DIAGNOSIS — F33.2 SEVERE RECURRENT MAJOR DEPRESSION WITHOUT PSYCHOTIC FEATURES (HCC): Primary | ICD-10-CM

## 2023-11-10 LAB — GLUCOSE BLD-MCNC: 177 MG/DL (ref 75–110)

## 2023-11-10 PROCEDURE — 3700000000 HC ANESTHESIA ATTENDED CARE: Performed by: ANESTHESIOLOGY

## 2023-11-10 PROCEDURE — 82947 ASSAY GLUCOSE BLOOD QUANT: CPT

## 2023-11-10 PROCEDURE — 2580000003 HC RX 258: Performed by: ANESTHESIOLOGY

## 2023-11-10 PROCEDURE — 2500000003 HC RX 250 WO HCPCS: Performed by: NURSE ANESTHETIST, CERTIFIED REGISTERED

## 2023-11-10 PROCEDURE — 90870 ELECTROCONVULSIVE THERAPY: CPT | Performed by: ANESTHESIOLOGY

## 2023-11-10 PROCEDURE — 6360000002 HC RX W HCPCS: Performed by: ANESTHESIOLOGY

## 2023-11-10 PROCEDURE — 90870 ELECTROCONVULSIVE THERAPY: CPT | Performed by: PSYCHIATRY & NEUROLOGY

## 2023-11-10 PROCEDURE — 7100000001 HC PACU RECOVERY - ADDTL 15 MIN: Performed by: ANESTHESIOLOGY

## 2023-11-10 PROCEDURE — 7100000000 HC PACU RECOVERY - FIRST 15 MIN: Performed by: ANESTHESIOLOGY

## 2023-11-10 RX ORDER — SUCCINYLCHOLINE/SOD CL,ISO/PF 200MG/10ML
SYRINGE (ML) INTRAVENOUS
Status: COMPLETED
Start: 2023-11-10 | End: 2023-11-10

## 2023-11-10 RX ORDER — METHOHEXITAL IN WATER/PF 100MG/10ML
SYRINGE (ML) INTRAVENOUS PRN
Status: DISCONTINUED | OUTPATIENT
Start: 2023-11-10 | End: 2023-11-10 | Stop reason: SDUPTHER

## 2023-11-10 RX ORDER — KETOROLAC TROMETHAMINE 30 MG/ML
30 INJECTION, SOLUTION INTRAMUSCULAR; INTRAVENOUS ONCE
Status: COMPLETED | OUTPATIENT
Start: 2023-11-10 | End: 2023-11-10

## 2023-11-10 RX ORDER — SODIUM CHLORIDE 0.9 % (FLUSH) 0.9 %
5-40 SYRINGE (ML) INJECTION EVERY 12 HOURS SCHEDULED
Status: DISCONTINUED | OUTPATIENT
Start: 2023-11-10 | End: 2023-11-11 | Stop reason: HOSPADM

## 2023-11-10 RX ORDER — SODIUM CHLORIDE 9 MG/ML
INJECTION, SOLUTION INTRAVENOUS PRN
Status: DISCONTINUED | OUTPATIENT
Start: 2023-11-10 | End: 2023-11-11 | Stop reason: HOSPADM

## 2023-11-10 RX ORDER — LIDOCAINE HYDROCHLORIDE 10 MG/ML
1 INJECTION, SOLUTION EPIDURAL; INFILTRATION; INTRACAUDAL; PERINEURAL
Status: DISCONTINUED | OUTPATIENT
Start: 2023-11-10 | End: 2023-11-11 | Stop reason: HOSPADM

## 2023-11-10 RX ORDER — SODIUM CHLORIDE, SODIUM LACTATE, POTASSIUM CHLORIDE, CALCIUM CHLORIDE 600; 310; 30; 20 MG/100ML; MG/100ML; MG/100ML; MG/100ML
INJECTION, SOLUTION INTRAVENOUS CONTINUOUS
Status: DISCONTINUED | OUTPATIENT
Start: 2023-11-10 | End: 2023-11-11 | Stop reason: HOSPADM

## 2023-11-10 RX ORDER — METHOHEXITAL IN WATER/PF 100MG/10ML
SYRINGE (ML) INTRAVENOUS
Status: COMPLETED
Start: 2023-11-10 | End: 2023-11-10

## 2023-11-10 RX ORDER — SUCCINYLCHOLINE/SOD CL,ISO/PF 200MG/10ML
SYRINGE (ML) INTRAVENOUS PRN
Status: DISCONTINUED | OUTPATIENT
Start: 2023-11-10 | End: 2023-11-10 | Stop reason: SDUPTHER

## 2023-11-10 RX ORDER — METHOHEXITAL IN WATER/PF 100MG/10ML
SYRINGE (ML) INTRAVENOUS
Status: DISPENSED
Start: 2023-11-10 | End: 2023-11-10

## 2023-11-10 RX ORDER — SODIUM CHLORIDE 0.9 % (FLUSH) 0.9 %
5-40 SYRINGE (ML) INJECTION PRN
Status: DISCONTINUED | OUTPATIENT
Start: 2023-11-10 | End: 2023-11-11 | Stop reason: HOSPADM

## 2023-11-10 RX ADMIN — Medication 120 MG: at 07:04

## 2023-11-10 RX ADMIN — Medication 180 MG: at 07:04

## 2023-11-10 RX ADMIN — KETOROLAC TROMETHAMINE 30 MG: 30 INJECTION INTRAMUSCULAR; INTRAVENOUS at 07:49

## 2023-11-10 RX ADMIN — SODIUM CHLORIDE, POTASSIUM CHLORIDE, SODIUM LACTATE AND CALCIUM CHLORIDE: 600; 310; 30; 20 INJECTION, SOLUTION INTRAVENOUS at 06:08

## 2023-11-10 ASSESSMENT — PAIN DESCRIPTION - LOCATION
LOCATION: HEAD
LOCATION: HEAD

## 2023-11-10 ASSESSMENT — PAIN DESCRIPTION - DESCRIPTORS
DESCRIPTORS: ACHING
DESCRIPTORS: ACHING

## 2023-11-10 ASSESSMENT — PAIN - FUNCTIONAL ASSESSMENT: PAIN_FUNCTIONAL_ASSESSMENT: 0-10

## 2023-11-10 ASSESSMENT — PAIN SCALES - GENERAL
PAINLEVEL_OUTOF10: 0
PAINLEVEL_OUTOF10: 5
PAINLEVEL_OUTOF10: 5

## 2023-11-10 ASSESSMENT — PAIN DESCRIPTION - PAIN TYPE: TYPE: SURGICAL PAIN

## 2023-11-10 NOTE — DISCHARGE INSTRUCTIONS
ELECTROCONVULSIVE THERAPY DISCHARGE INSTRUCTIONS         1. Activity - Rest today. The anesthetic agents you have received can make you feel drowsy or tired. A responsible person must drive you home and stay with you for 8 hours after discharge from the [address] [place] [address] not drive a motor vehicle or operate any machinery for 24 hours. .   *Do not make any complex decisions or execute any legal documents until 3 weeks AFTER your last treatment. If you have any questions regarding this, speak with your psychiatrist first.*    2. Diet - Nothing to eat or drink after midnight the night of your ECT treatment. After ECT, start with clear liquids, if you can drink without coughing, you may proceed with gradually progressing to your usual diet. No alcoholic beverages for 24 hours. 3. Medications - Take your daily medications as prescribed, after you return home from today's ECT. If you take a Beta Blocker or have been prescribed Imitrex, you may be instructed to take these medications the morning of ECT. If you are unsure please ask the physician. Take with these medication the morning of ECT with one Tablespoon of water. Tylenol 650 mg  All blood pressure medications  ***    4. You may experience the following after your treatment:   a. Poor memory   b. Poor balance   c. Headaches   d. Confusion   e. Muscle soreness   f. Nausea      5. Special Precautions - Contact you physician immediately if these occur:   a. Signs of infection: redness, swelling, heat, red streaks at the site of the IV   b. Excessive pain unrelieved by prescription pain medications   c. Nausea and vomiting that persists beyond the first day after your procedure    6. Next Procedure Date:   Your next ECT treatment is scheduled for 720 AM on 11/13/23. ARRIVE  AM    **You are also invited to join us at our monthly Electroconvulsive Therapy Peer Support Group.   This support group is held once a month, on the last Monday of the month with

## 2023-11-10 NOTE — PROCEDURES
Bilateral ECT Procedure Report  Smith Fischer   11/10/2023  1965         Attending:Antoni Carr MD  Preprocedure diagnosis: Major depressive disorder, recurrent severe without psychotic features (F33.2)  Postprocedure diagnosis: SAME AS PRE-PROCEDURE DIAGNOSIS  Treatment Number: This is treatment # 8   Patient Status: Outpatient   Type of ECT: Bilateral Brief Pulse  Medications  Preprocedure: Tylenol 650mg  Anesthetic: Methohexital 100 mg  Paralytic: Succinylcholine 140 mg  Post procedure: none needed  mg  Cuff placement: Left Lower Extremity    MECTA Settings 1st Stimulus:     Pulse width: 1.0 ms   Frequency:  40 hz   Duration:   2.0 seconds   Static Impedance: 211 ohms             Dynamic Impedance: 188 ohms             Motor Seizure Duration: 27 seconds  EEG Seizure Duration: 34 seconds             The patient's ECT treatment was performed using MECTA machine  . Timeout:  Time out was performed using two patient identifiers and confirmation of procedure. Patient Preparation: Preprocedure documentation, labs, H&P were all verified and reviewed. The patient was placed in supine position. EEG leads were placed for monitoring of seizure. [place] areas bilaterally cleaned and prepped. Conductive gel  was applied over stimulus electrode site. The patient was pre-oxygenated. Procedure in detail: Medications were administered by anesthesia using intravenous access at the doses listed previously in this summary. Anesthetic administered and once confirmation the patient is anesthetized, the patient's blood pressure cuff on lower extremity was inflated to 100mmHg in excess of patient's blood pressure. At this time, the neuromuscular blockade was administered intravenously by anesthesia. Upper extremity fasciculation were verified followed by lower extremity fasciculation. Once fasciculations terminated, confirmation of affective neuromuscular blockade demonstrated by absence of withdrawal reflex.

## 2023-11-10 NOTE — INTERVAL H&P NOTE
Update History & Physical    The patient's History and Physical of October 23, 2023 was reviewed with the patient and I examined the patient. Any changes are as documented below. Brandon Fritz is 62 y.o.  male, here for ECT treatment. Last ECT treatment was 11/08/23. Pt tolerated last treatment well. Pt currently having ECT treatments three times per week but believes he will be going down to twice per week. Pt feels ECT treatments have been effective in improving overall mood. Rating average mood 7/10 with 10 being the best mood. Patient's sleep has been fair. Appetite has been fair. Pt is not suicidal. Pt is not homicidal. Pt denies auditory/visual hallucinations. Pt has been taking psychiatric medications as prescribed. Pt AAO x 3 in NAD. HRRR. No adventitious lung sounds. No respiratory distress. NPO p MN. Took no medications this am. Denies recent or current chest pain/pressure, palpitations, SOB, recent URI, fever or chills. Review vitals per RN flowsheet.      Electronically signed by JOURDAN Gloria CNP on 11/10/2023 at 5:38 AM

## 2023-11-13 ENCOUNTER — ANESTHESIA (OUTPATIENT)
Dept: POSTOP/PACU | Age: 58
End: 2023-11-13
Payer: COMMERCIAL

## 2023-11-13 ENCOUNTER — HOSPITAL ENCOUNTER (OUTPATIENT)
Dept: POSTOP/PACU | Age: 58
Discharge: HOME | End: 2023-11-13
Payer: COMMERCIAL

## 2023-11-13 VITALS
OXYGEN SATURATION: 94 % | WEIGHT: 197 LBS | RESPIRATION RATE: 16 BRPM | HEIGHT: 72 IN | SYSTOLIC BLOOD PRESSURE: 129 MMHG | HEART RATE: 96 BPM | DIASTOLIC BLOOD PRESSURE: 89 MMHG | BODY MASS INDEX: 26.68 KG/M2 | TEMPERATURE: 97 F

## 2023-11-13 DIAGNOSIS — F33.2 SEVERE RECURRENT MAJOR DEPRESSION WITHOUT PSYCHOTIC FEATURES (HCC): Primary | ICD-10-CM

## 2023-11-13 LAB — GLUCOSE BLD-MCNC: 140 MG/DL (ref 75–110)

## 2023-11-13 PROCEDURE — 7100000001 HC PACU RECOVERY - ADDTL 15 MIN: Performed by: ANESTHESIOLOGY

## 2023-11-13 PROCEDURE — 2580000003 HC RX 258: Performed by: ANESTHESIOLOGY

## 2023-11-13 PROCEDURE — 90870 ELECTROCONVULSIVE THERAPY: CPT | Performed by: ANESTHESIOLOGY

## 2023-11-13 PROCEDURE — 82947 ASSAY GLUCOSE BLOOD QUANT: CPT

## 2023-11-13 PROCEDURE — 2500000003 HC RX 250 WO HCPCS: Performed by: NURSE ANESTHETIST, CERTIFIED REGISTERED

## 2023-11-13 PROCEDURE — 3700000000 HC ANESTHESIA ATTENDED CARE: Performed by: ANESTHESIOLOGY

## 2023-11-13 PROCEDURE — 3700000001 HC ADD 15 MINUTES (ANESTHESIA): Performed by: ANESTHESIOLOGY

## 2023-11-13 PROCEDURE — 90870 ELECTROCONVULSIVE THERAPY: CPT | Performed by: PSYCHIATRY & NEUROLOGY

## 2023-11-13 PROCEDURE — 7100000000 HC PACU RECOVERY - FIRST 15 MIN: Performed by: ANESTHESIOLOGY

## 2023-11-13 RX ORDER — SODIUM CHLORIDE 9 MG/ML
INJECTION, SOLUTION INTRAVENOUS PRN
Status: DISCONTINUED | OUTPATIENT
Start: 2023-11-13 | End: 2023-11-14 | Stop reason: HOSPADM

## 2023-11-13 RX ORDER — LIDOCAINE HYDROCHLORIDE 10 MG/ML
1 INJECTION, SOLUTION EPIDURAL; INFILTRATION; INTRACAUDAL; PERINEURAL
Status: DISCONTINUED | OUTPATIENT
Start: 2023-11-13 | End: 2023-11-14 | Stop reason: HOSPADM

## 2023-11-13 RX ORDER — SUCCINYLCHOLINE/SOD CL,ISO/PF 200MG/10ML
SYRINGE (ML) INTRAVENOUS
Status: COMPLETED
Start: 2023-11-13 | End: 2023-11-13

## 2023-11-13 RX ORDER — SODIUM CHLORIDE, SODIUM LACTATE, POTASSIUM CHLORIDE, CALCIUM CHLORIDE 600; 310; 30; 20 MG/100ML; MG/100ML; MG/100ML; MG/100ML
INJECTION, SOLUTION INTRAVENOUS CONTINUOUS
Status: DISCONTINUED | OUTPATIENT
Start: 2023-11-13 | End: 2023-11-14 | Stop reason: HOSPADM

## 2023-11-13 RX ORDER — ETOMIDATE 2 MG/ML
INJECTION INTRAVENOUS
Status: COMPLETED
Start: 2023-11-13 | End: 2023-11-13

## 2023-11-13 RX ORDER — SODIUM CHLORIDE 0.9 % (FLUSH) 0.9 %
5-40 SYRINGE (ML) INJECTION PRN
Status: DISCONTINUED | OUTPATIENT
Start: 2023-11-13 | End: 2023-11-14 | Stop reason: HOSPADM

## 2023-11-13 RX ORDER — SODIUM CHLORIDE 0.9 % (FLUSH) 0.9 %
5-40 SYRINGE (ML) INJECTION EVERY 12 HOURS SCHEDULED
Status: DISCONTINUED | OUTPATIENT
Start: 2023-11-13 | End: 2023-11-14 | Stop reason: HOSPADM

## 2023-11-13 RX ORDER — ETOMIDATE 2 MG/ML
INJECTION INTRAVENOUS PRN
Status: DISCONTINUED | OUTPATIENT
Start: 2023-11-13 | End: 2023-11-13 | Stop reason: SDUPTHER

## 2023-11-13 RX ORDER — SUCCINYLCHOLINE/SOD CL,ISO/PF 200MG/10ML
SYRINGE (ML) INTRAVENOUS PRN
Status: DISCONTINUED | OUTPATIENT
Start: 2023-11-13 | End: 2023-11-13 | Stop reason: SDUPTHER

## 2023-11-13 RX ADMIN — Medication 180 MG: at 07:38

## 2023-11-13 RX ADMIN — ETOMIDATE INJECTION 20 MG: 2 SOLUTION INTRAVENOUS at 07:38

## 2023-11-13 RX ADMIN — SODIUM CHLORIDE, POTASSIUM CHLORIDE, SODIUM LACTATE AND CALCIUM CHLORIDE: 600; 310; 30; 20 INJECTION, SOLUTION INTRAVENOUS at 06:37

## 2023-11-13 ASSESSMENT — PAIN - FUNCTIONAL ASSESSMENT: PAIN_FUNCTIONAL_ASSESSMENT: 0-10

## 2023-11-13 NOTE — PROCEDURES
Bite blocks were put in place. Stimulus leads then applied to stimulus electrode site bilaterally with the center of the lead placed approximately 1 inch superior to the midpoint of line between canthus and the tragus bilaterally. Static impedance was tested and within appropriate limits. MECTA settings were confirmed with nursing staff. Staff was cleared from the patient and dose of ECT stimulus was delivered. Motor seizure activity  was observed at duration noted and terminated. EEG seizure activity was observed of duration noted that was confirmed via monitoring EEG and terminated with appropriate postictal suppression noted on EEG. Static impedance and dynamic impedance were documented. Tourniquet on  lower extremity was released and recovery procedures initiated. The patient was mask ventilated during the procedure with no significant drops in oxygenation saturation and tolerated the procedure well without any notable adverse effects. Condition: The patient was in stable condition with stable vital signs and able to follow commands when moved to recovery.

## 2023-11-13 NOTE — H&P
10/18,2023  HISTORY OF PRESENT ILLNESS:    The patient is a 62 y.o. left-handed male with significant history of major depressive disorder, generalized anxiety disorder, panic disorder who is referred by his outpatient psychiatrist for evaluation of ECT. Patient is present with his wife on the call and patient gives permission to have his wife participate in the interview. They are located in their home. Visit is conducted over telehealth     Patient reports suffering from severe anxiety and depression his whole life. He states that he has been on numerous medications as outlined below in his both current and past medication history. He states when the medications do help to alleviate the intensity of his depression and anxiety they have not been able to relieve him of that. Furthermore he reports worsening depression and anxiety over the past several months to years that has led to an inability to maintain a full-time job. He has had problems with maintaining employment due to requiring absences due to his anxiety and depression. This is a significant decrease in his function as he has been employed at [employer] for over 20 years with good job attendance. He endorses passive suicidal ideations but denies any intention or plan and is able to contract for safety. He currently is involved with his therapist Jailene Garcia as well as his outpatient psychiatrist Dr. Gabby Floyd in [place]. He has had discussions of transcranial magnetic stimulation, ECT. At present time he reports after discussion about [address] with his outpatient psychiatrist the feeling was that things have advanced too far for him to get benefit from [address] alone. After discussion of risk benefits and alternatives with regards to other treatment modalities we did agree that the patient and his wife would think about ECT and call us if they decide to move forward.   Spent time in discussing side effects to ECT, specifically emphasize prominent

## 2023-11-13 NOTE — H&P (VIEW-ONLY)
HISTORY and [address]       NAME:  Deshawn Montano  MRN: [id number]   YOB: 1965   Date: 11/13/2023   Age: 62 y.o. Gender: male     H&P Update Note    H&P from 10/23/2023 reviewed and updated. Patient examined. INTERVAL HISTORY:     Deshawn Montano is 62 y.o.,  male, here for ECT treatment. Last ECT treatment was 11/10/23. Pt has ECT treatment three times per week. Pt tolerated last treatment well. Patient reports that his symptoms are improving but not too much. Patient has complaints of some short term memory loss. Mood is rated at /10. With 10 is the best mood. He denies feeling of : hopelessness, helplessness, or low energy. Patient's sleep has been fiar. Appetite has been good. Pt denies feeling or thoughts of  suicidal/ homicidal. Pt has no auditory/visual hallucinations. Pt has been taking psychiatric medications as prescribed. Pt does not have any other somatic complaints at this time. Patient is feeling well today, denies any fever/chills, chest pain, shortness of breath. Pt Npo since the past midnight, no am medication today     No interval changes to past medical history, social history, family history. Review of systems as stated above and otherwise negative. PHYSICAL EXAM:     Vitals: see nursing flow sheet for vital sings     Patient is alert and oriented, in no distress. Hernandez Corrente Heart rate and rhythm are regular. Lungs clear to auscultation bilaterally. Abdomen is soft, non tender. No pedal edema. No interval changes. I concur with the findings. Glo Gomez, APRN - CNP on 11/13/2023 at 6:10 AM            HISTORY and [address]         NAME:  Deshawn Montano  MRN: [id number]   YOB: 1965   Date: 10/23/2023   Age: 62 y.o. Gender: male         COMPLAINT AND PRESENT HISTORY:      Deshawn Montano is 62 y.o. , male, Preadmission Testing for ECT treatments.       Psychiatry assessment per Dr Reji Loja

## 2023-11-14 ENCOUNTER — ANESTHESIA EVENT (OUTPATIENT)
Dept: POSTOP/PACU | Age: 58
End: 2023-11-14
Payer: COMMERCIAL

## 2023-11-15 ENCOUNTER — ANESTHESIA (OUTPATIENT)
Dept: POSTOP/PACU | Age: 58
End: 2023-11-15
Payer: COMMERCIAL

## 2023-11-15 ENCOUNTER — HOSPITAL ENCOUNTER (OUTPATIENT)
Dept: POSTOP/PACU | Age: 58
Discharge: HOME | End: 2023-11-15
Payer: COMMERCIAL

## 2023-11-15 VITALS
TEMPERATURE: 98.2 F | RESPIRATION RATE: 17 BRPM | WEIGHT: 197 LBS | HEART RATE: 91 BPM | SYSTOLIC BLOOD PRESSURE: 147 MMHG | DIASTOLIC BLOOD PRESSURE: 92 MMHG | OXYGEN SATURATION: 94 % | HEIGHT: 72 IN | BODY MASS INDEX: 26.68 KG/M2

## 2023-11-15 LAB — GLUCOSE BLD-MCNC: 133 MG/DL (ref 75–110)

## 2023-11-15 PROCEDURE — 2500000003 HC RX 250 WO HCPCS: Performed by: NURSE ANESTHETIST, CERTIFIED REGISTERED

## 2023-11-15 PROCEDURE — 3700000000 HC ANESTHESIA ATTENDED CARE: Performed by: ANESTHESIOLOGY

## 2023-11-15 PROCEDURE — 90870 ELECTROCONVULSIVE THERAPY: CPT

## 2023-11-15 PROCEDURE — 90870 ELECTROCONVULSIVE THERAPY: CPT | Performed by: PSYCHIATRY & NEUROLOGY

## 2023-11-15 PROCEDURE — 3700000001 HC ADD 15 MINUTES (ANESTHESIA): Performed by: ANESTHESIOLOGY

## 2023-11-15 PROCEDURE — 2580000003 HC RX 258: Performed by: ANESTHESIOLOGY

## 2023-11-15 PROCEDURE — 7100000000 HC PACU RECOVERY - FIRST 15 MIN: Performed by: ANESTHESIOLOGY

## 2023-11-15 PROCEDURE — 7100000001 HC PACU RECOVERY - ADDTL 15 MIN: Performed by: ANESTHESIOLOGY

## 2023-11-15 PROCEDURE — 82947 ASSAY GLUCOSE BLOOD QUANT: CPT

## 2023-11-15 RX ORDER — SODIUM CHLORIDE 0.9 % (FLUSH) 0.9 %
5-40 SYRINGE (ML) INJECTION EVERY 12 HOURS SCHEDULED
Status: DISCONTINUED | OUTPATIENT
Start: 2023-11-15 | End: 2023-11-16 | Stop reason: HOSPADM

## 2023-11-15 RX ORDER — SODIUM CHLORIDE 9 MG/ML
INJECTION, SOLUTION INTRAVENOUS PRN
Status: DISCONTINUED | OUTPATIENT
Start: 2023-11-15 | End: 2023-11-16 | Stop reason: HOSPADM

## 2023-11-15 RX ORDER — SODIUM CHLORIDE 0.9 % (FLUSH) 0.9 %
5-40 SYRINGE (ML) INJECTION PRN
Status: DISCONTINUED | OUTPATIENT
Start: 2023-11-15 | End: 2023-11-16 | Stop reason: HOSPADM

## 2023-11-15 RX ORDER — SODIUM CHLORIDE, SODIUM LACTATE, POTASSIUM CHLORIDE, CALCIUM CHLORIDE 600; 310; 30; 20 MG/100ML; MG/100ML; MG/100ML; MG/100ML
INJECTION, SOLUTION INTRAVENOUS CONTINUOUS
Status: DISCONTINUED | OUTPATIENT
Start: 2023-11-15 | End: 2023-11-16 | Stop reason: HOSPADM

## 2023-11-15 RX ORDER — SUCCINYLCHOLINE/SOD CL,ISO/PF 100 MG/5ML
SYRINGE (ML) INTRAVENOUS PRN
Status: DISCONTINUED | OUTPATIENT
Start: 2023-11-15 | End: 2023-11-15 | Stop reason: SDUPTHER

## 2023-11-15 RX ORDER — LIDOCAINE HYDROCHLORIDE 10 MG/ML
1 INJECTION, SOLUTION EPIDURAL; INFILTRATION; INTRACAUDAL; PERINEURAL
Status: DISCONTINUED | OUTPATIENT
Start: 2023-11-15 | End: 2023-11-16 | Stop reason: HOSPADM

## 2023-11-15 RX ORDER — ETOMIDATE 2 MG/ML
INJECTION INTRAVENOUS
Status: COMPLETED
Start: 2023-11-15 | End: 2023-11-15

## 2023-11-15 RX ORDER — ETOMIDATE 2 MG/ML
INJECTION INTRAVENOUS PRN
Status: DISCONTINUED | OUTPATIENT
Start: 2023-11-15 | End: 2023-11-15 | Stop reason: SDUPTHER

## 2023-11-15 RX ADMIN — Medication 180 MG: at 07:11

## 2023-11-15 RX ADMIN — SODIUM CHLORIDE, POTASSIUM CHLORIDE, SODIUM LACTATE AND CALCIUM CHLORIDE: 600; 310; 30; 20 INJECTION, SOLUTION INTRAVENOUS at 06:14

## 2023-11-15 RX ADMIN — ETOMIDATE INJECTION 20 MG: 2 SOLUTION INTRAVENOUS at 07:11

## 2023-11-15 ASSESSMENT — PAIN - FUNCTIONAL ASSESSMENT: PAIN_FUNCTIONAL_ASSESSMENT: 0-10

## 2023-11-15 NOTE — PROCEDURES
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   Duration:   3.0 seconds   Static Impedance: 2435 ohms             Dynamic Impedance: 162 ohms             Motor Seizure Duration: 36 seconds  EEG Seizure Duration: 53 seconds             The patient's ECT treatment was performed using MECTA machine  . Timeout:  Time out was performed using two patient identifiers and confirmation of procedure. Patient Preparation: Preprocedure documentation, labs, H&P were all verified and reviewed. The patient was placed in supine position. EEG leads were placed for monitoring of seizure. Religious areas bilaterally cleaned and prepped. Conductive gel  was applied over stimulus electrode site. The patient was pre-oxygenated. Procedure in detail: Medications were administered by anesthesia using intravenous access at the doses listed previously in this summary. Anesthetic administered and once confirmation the patient is anesthetized, the patient's blood pressure cuff on lower extremity was inflated to 100mmHg in excess of patient's blood pressure. At this time, the neuromuscular blockade was administered intravenously by anesthesia. Upper extremity fasciculation were verified followed by lower extremity fasciculation.   Once fasciculations terminated, confirmation of affective neuromuscular blockade demonstrated by absence of withdrawal

## 2023-11-15 NOTE — DISCHARGE INSTRUCTIONS
a bath. Do not drink alcohol. Do not make any important decisions or sign legal documents. Stay with an adult. The first meal following your procedure should be light and small. Avoid solid foods if you feel sick to your stomach (nauseous) or if you throw up (vomit). Drink enough fluids to keep your urine clear or pale yellow. Only take your usual medicines or new medicines if your caregiver approves them. Only take over-the-counter or prescription medicines for pain, discomfort, or fever as directed by your caregiver. Keep all follow-up appointments as directed by your caregiver. SEEK IMMEDIATE MEDICAL CARE IF:   You are not feeling normal or behaving normally after 24 hours. You have persistent nausea and vomiting. You are unable to drink fluids or eat food. You have difficulty urinating. You have difficulty breathing or speaking. You have blue or gray skin. There is difficulty waking or you cannot be woken up. You have heavy bleeding, redness, or a lot of swelling where the sedative or anesthesia entered your skin (intravenous site). You have a rash. MAKE SURE YOU:  Understand these instructions. Will watch your condition. Will get help right away if you are not doing well or get worse. Document Released: 12/18/2006 Document Revised: 06/18/2013 Document Reviewed: 04/17/2013  [facility] Patient Information [id number] [facility].

## 2023-11-15 NOTE — INTERVAL H&P NOTE
Update History & Physical    The patient's History and Physical of October 23, 2023 was reviewed with the patient and I examined the patient. There was no change. The surgical site was confirmed by the patient and me. Jesus Knowles is 62 y.o.,  male, here for ECT treatment. Patient is being treated for major depressive disorder   Last ECT treatment was 11/13/23 . Pt tolerated last treatment well. Pt reports ECT treatments have been effective at the current frequency. \" I think good overall\"    Rating average mood 8/10  with 10 being the best mood. Pt admits to  minimal depression  with low energy. Patient's sleep has been not as good lately . Appetite has been fine . Pt denies any thoughts of suicidal. Pt also denies any thoughts of homicidal. Pt denies auditory/visual hallucinations. Pt reports being compliant with taking all prescribed medications. Pt AAO x 3 in NAD. HRRR. No adventitious lung sounds. No respiratory distress. NPO p MN. Took NO medications this am. Denies recent or current chest pain/pressure, palpitations, SOB, recent URI, fever or chills. Patient denies any changes in medical condition. Patient denies any issues with Anesthesia. Review vitals per RN flowsheet. Plan: The risks, benefits, expected outcome, and alternative to the recommended procedure have been discussed with the patient. Patient understands and wants to proceed with the procedure.      Electronically signed by JOURDAN Kent CNP on 11/15/2023 at 5:43 AM

## 2023-11-15 NOTE — PROGRESS NOTES
Pre ECT Assessment Note  Psychiatry  11/15/2023      [facility]  1965  [id number]      Subjective:     Patient is a 62 y.o.  male seen for an evaluation prior to today's electroconvulsive therapy treatment. Today is treatment number 10, utilizing bilateral.  This is course number 1. The patient has never previously completed a course of ECT treatment. Laurie Delvalle endorses improvement in his mood, his wife agrees that he seems to be improving daily. She reports that his overall affect has been brighter. He endorses an increase in motivation. He denies any suicidal ideation. He reports minimal cognitive side effects, his wife reports that she has noticed some though is not distressing and minimal.  We will continue with treatment again this Friday and likely transition to twice weekly next week. Past Medical History:   Diagnosis Date    Depression     GERD (gastroesophageal reflux disease)     Gout     Hypertension     Migraines     Pre-diabetes       Past Surgical History:   Procedure Laterality Date    APPENDECTOMY  2006    COLONOSCOPY      FINGER AMPUTATION Right     middle, partial      Not in a hospital admission. Allergies   Allergen Reactions    Codeine       Social History     Tobacco Use    Smoking status: Never    Smokeless tobacco: Never   Substance Use Topics    Alcohol use: Yes     Comment: social      History reviewed. No pertinent family history.        Objective:       Mental Status Evaluation:  Appearance:  Wearing gown, on stretcher, well groomed   Behavior:  Cooperative, present   Speech:  Normal rate, volume and tone   Mood:  \"Better\"   Affect:  Congruent   Thought Process:  Linear and organized   Thought Content:  no hallucinations and no delusions, denies suicidal ideation   Sensorium:  Does not appear to attend to internal stimuli   Cognition:  Oriented to person, place and general circumstance   Insight:  good   Judgment:  good     Assessment:     Diagnosis: Major depressive

## 2023-11-16 ENCOUNTER — ANESTHESIA EVENT (OUTPATIENT)
Dept: POSTOP/PACU | Age: 58
End: 2023-11-16
Payer: COMMERCIAL

## 2023-11-17 ENCOUNTER — HOSPITAL ENCOUNTER (OUTPATIENT)
Dept: POSTOP/PACU | Age: 58
Discharge: HOME | End: 2023-11-17
Payer: COMMERCIAL

## 2023-11-17 ENCOUNTER — ANESTHESIA (OUTPATIENT)
Dept: POSTOP/PACU | Age: 58
End: 2023-11-17
Payer: COMMERCIAL

## 2023-11-17 ENCOUNTER — ANESTHESIA EVENT (OUTPATIENT)
Dept: POSTOP/PACU | Age: 58
End: 2023-11-17
Payer: COMMERCIAL

## 2023-11-17 VITALS
WEIGHT: 197 LBS | DIASTOLIC BLOOD PRESSURE: 101 MMHG | OXYGEN SATURATION: 96 % | SYSTOLIC BLOOD PRESSURE: 233 MMHG | HEART RATE: 56 BPM | RESPIRATION RATE: 22 BRPM | BODY MASS INDEX: 26.68 KG/M2 | HEIGHT: 72 IN | TEMPERATURE: 97.7 F

## 2023-11-17 LAB — GLUCOSE BLD-MCNC: 141 MG/DL (ref 75–110)

## 2023-11-17 PROCEDURE — 7100000000 HC PACU RECOVERY - FIRST 15 MIN: Performed by: ANESTHESIOLOGY

## 2023-11-17 PROCEDURE — 90870 ELECTROCONVULSIVE THERAPY: CPT

## 2023-11-17 PROCEDURE — 2500000003 HC RX 250 WO HCPCS: Performed by: NURSE ANESTHETIST, CERTIFIED REGISTERED

## 2023-11-17 PROCEDURE — 82947 ASSAY GLUCOSE BLOOD QUANT: CPT

## 2023-11-17 PROCEDURE — 7100000001 HC PACU RECOVERY - ADDTL 15 MIN: Performed by: ANESTHESIOLOGY

## 2023-11-17 PROCEDURE — 3700000000 HC ANESTHESIA ATTENDED CARE: Performed by: ANESTHESIOLOGY

## 2023-11-17 PROCEDURE — 2580000003 HC RX 258: Performed by: ANESTHESIOLOGY

## 2023-11-17 PROCEDURE — 90870 ELECTROCONVULSIVE THERAPY: CPT | Performed by: PSYCHIATRY & NEUROLOGY

## 2023-11-17 RX ORDER — ETOMIDATE 2 MG/ML
INJECTION INTRAVENOUS PRN
Status: DISCONTINUED | OUTPATIENT
Start: 2023-11-17 | End: 2023-11-17 | Stop reason: SDUPTHER

## 2023-11-17 RX ORDER — ETOMIDATE 2 MG/ML
INJECTION INTRAVENOUS
Status: COMPLETED
Start: 2023-11-17 | End: 2023-11-17

## 2023-11-17 RX ORDER — SODIUM CHLORIDE, SODIUM LACTATE, POTASSIUM CHLORIDE, CALCIUM CHLORIDE 600; 310; 30; 20 MG/100ML; MG/100ML; MG/100ML; MG/100ML
INJECTION, SOLUTION INTRAVENOUS CONTINUOUS
Status: DISCONTINUED | OUTPATIENT
Start: 2023-11-17 | End: 2023-11-18 | Stop reason: HOSPADM

## 2023-11-17 RX ORDER — SODIUM CHLORIDE 0.9 % (FLUSH) 0.9 %
5-40 SYRINGE (ML) INJECTION EVERY 12 HOURS SCHEDULED
Status: DISCONTINUED | OUTPATIENT
Start: 2023-11-17 | End: 2023-11-18 | Stop reason: HOSPADM

## 2023-11-17 RX ORDER — SUCCINYLCHOLINE/SOD CL,ISO/PF 200MG/10ML
SYRINGE (ML) INTRAVENOUS
Status: COMPLETED
Start: 2023-11-17 | End: 2023-11-17

## 2023-11-17 RX ORDER — LIDOCAINE HYDROCHLORIDE 10 MG/ML
1 INJECTION, SOLUTION EPIDURAL; INFILTRATION; INTRACAUDAL; PERINEURAL
Status: DISCONTINUED | OUTPATIENT
Start: 2023-11-17 | End: 2023-11-18 | Stop reason: HOSPADM

## 2023-11-17 RX ORDER — SODIUM CHLORIDE 0.9 % (FLUSH) 0.9 %
5-40 SYRINGE (ML) INJECTION PRN
Status: DISCONTINUED | OUTPATIENT
Start: 2023-11-17 | End: 2023-11-18 | Stop reason: HOSPADM

## 2023-11-17 RX ORDER — SODIUM CHLORIDE 9 MG/ML
INJECTION, SOLUTION INTRAVENOUS PRN
Status: DISCONTINUED | OUTPATIENT
Start: 2023-11-17 | End: 2023-11-18 | Stop reason: HOSPADM

## 2023-11-17 RX ORDER — SUCCINYLCHOLINE/SOD CL,ISO/PF 100 MG/5ML
SYRINGE (ML) INTRAVENOUS PRN
Status: DISCONTINUED | OUTPATIENT
Start: 2023-11-17 | End: 2023-11-17 | Stop reason: SDUPTHER

## 2023-11-17 RX ADMIN — Medication 180 MG: at 07:07

## 2023-11-17 RX ADMIN — SODIUM CHLORIDE, POTASSIUM CHLORIDE, SODIUM LACTATE AND CALCIUM CHLORIDE: 600; 310; 30; 20 INJECTION, SOLUTION INTRAVENOUS at 06:05

## 2023-11-17 RX ADMIN — ETOMIDATE INJECTION 20 MG: 2 SOLUTION INTRAVENOUS at 07:07

## 2023-11-17 ASSESSMENT — PAIN - FUNCTIONAL ASSESSMENT: PAIN_FUNCTIONAL_ASSESSMENT: 0-10

## 2023-11-17 NOTE — DISCHARGE INSTRUCTIONS
ELECTROCONVULSIVE THERAPY DISCHARGE INSTRUCTIONS         1. Activity - Rest today. The anesthetic agents you have received can make you feel drowsy or tired. A responsible person must drive you home and stay with you for 8 hours after discharge from the [address] [place] [address] not drive a motor vehicle or operate any machinery for 24 hours. .   *Do not make any complex decisions or execute any legal documents until 3 weeks AFTER your last treatment. If you have any questions regarding this, speak with your psychiatrist first.*    2. Diet - Nothing to eat or drink after midnight the night of your ECT treatment. After ECT, start with clear liquids, if you can drink without coughing, you may proceed with gradually progressing to your usual diet. No alcoholic beverages for 24 hours. 3. Medications - Take your daily medications as prescribed, after you return home from today's ECT. If you take a Beta Blocker or have been prescribed Imitrex, you may be instructed to take these medications the morning of ECT. If you are unsure please ask the physician. Take with these medication the morning of ECT with one Tablespoon of water. Tylenol 650 mg  All blood pressure medications  ***    4. You may experience the following after your treatment:   a. Poor memory   b. Poor balance   c. Headaches   d. Confusion   e. Muscle soreness   f. Nausea      5. Special Precautions - Contact you physician immediately if these occur:   a. Signs of infection: redness, swelling, heat, red streaks at the site of the IV   b. Excessive pain unrelieved by prescription pain medications   c. Nausea and vomiting that persists beyond the first day after your procedure    6. Next Procedure Date:   Your next ECT treatment is scheduled for 720 am on November 20th. Please arrive to the hospital by 550 am that morning. **You are also invited to join us at our monthly Electroconvulsive Therapy Peer Support Group.   This support group is held once a

## 2023-11-17 NOTE — INTERVAL H&P NOTE
Update History & Physical    The patient's History and Physical of October 23, 2023 was reviewed with the patient and I examined the patient. Any changes are as documented below. Ge Lynn is 62 y.o.  male, here for ECT treatment. Pt having ECT treatments three times per week. Last ECT treatment was 11/15/23. Pt tolerated last treatment well. Pt reports ECT treatments have been effective in improving overall mood. Rating average mood 7/10 with 10 being the best mood. Other symptoms include: hopelessness, helplessness, low energy. Patient's sleep has been poor. Appetite has been fair. Pt is not suicidal. Pt is not homicidal. Pt denies auditory/visual hallucinations. Pt has been taking psychiatric medications as prescribed. Pt AAO x 3 in NAD. HRRR. No adventitious lung sounds. No respiratory distress. NPO p MN. Took no medications this am. Denies recent or current chest pain/pressure, palpitations, SOB, recent URI, fever or chills. Review vitals per RN flowsheet.          Electronically signed by JOURDAN Hopkins CNP on 11/17/2023 at 5:37 AM

## 2023-11-17 NOTE — PROGRESS NOTES
Pre ECT Assessment Note  Psychiatry  11/17/2023      Annetta Feng  1965  [id number]      Subjective:     Patient is a 62 y.o.  male seen for an evaluation prior to today's electroconvulsive therapy treatment. Today is treatment number  11 , utilizing bilateral.  This is course number 1. The patient has never previously completed a course of ECT treatment. Patient endorses an improvement in mood and his wife agrees. He endorses minimal cognitive side effects. His wife reports she noticed slight confusion in his memory but does not deem it as distressing or lasting. He denies any suicidal ideation. We will continue treatment on Monday and proceed with treatment twice next week. There are no problems to display for this patient. Past Medical History:   Diagnosis Date    Depression     GERD (gastroesophageal reflux disease)     Gout     Hypertension     Migraines     Pre-diabetes       Past Surgical History:   Procedure Laterality Date    APPENDECTOMY  2006    COLONOSCOPY      FINGER AMPUTATION Right     middle, partial      Not in a hospital admission. Allergies   Allergen Reactions    Codeine       Social History     Tobacco Use    Smoking status: Never    Smokeless tobacco: Never   Substance Use Topics    Alcohol use: Yes     Comment: social      History reviewed. No pertinent family history.        Objective:       Mental Status Evaluation:  Appearance:  Wearing gown, on stretcher, well groomed   Behavior:  Cooperative, pleasant   Speech:  Normal rate, volume and tone   Mood:  \"good'   Affect:  Congruent   Thought Process:  Linear and organized   Thought Content:  no hallucinations and no delusions, denies suicidal ideation   Sensorium:  Does not appear to attend to internal stimuli   Cognition:  Oriented to person, place and general circumstance   Insight:  good   Judgment:  good     Assessment:     Diagnosis: Major depressive disorder, recurrent severe without psychotic features    Plan:

## 2023-11-17 NOTE — PROCEDURES
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   Duration:   3.0 seconds   Static Impedance: 192 ohms             Dynamic Impedance: 181 ohms             Motor Seizure Duration: 38 seconds  EEG Seizure Duration: 54 seconds             The patient's ECT treatment was performed using MECTA machine  . Timeout:  Time out was performed using two patient identifiers and confirmation of procedure. Patient Preparation: Preprocedure documentation, labs, H&P were all verified and reviewed. The patient was placed in supine position. EEG leads were placed for monitoring of seizure. Sabianist areas bilaterally cleaned and prepped. Conductive gel  was applied over stimulus electrode site. The patient was pre-oxygenated. Procedure in detail: Medications were administered by anesthesia using intravenous access at the doses listed previously in this summary. Anesthetic administered and once confirmation the patient is anesthetized, the patient's blood pressure cuff on lower extremity was inflated to 100mmHg in excess of patient's blood pressure. At this time, the neuromuscular blockade was administered intravenously by anesthesia. Upper extremity fasciculation were verified followed by lower extremity fasciculation.   Once fasciculations terminated, confirmation of affective neuromuscular blockade demonstrated by absence of withdrawal

## 2023-11-17 NOTE — ANESTHESIA PRE PROCEDURE
Vascular: negative vascular ROS. Other Findings:             Anesthesia Plan      general     ASA 2     (TIVA)  Induction: intravenous. MIPS: Prophylactic antiemetics administered. Anesthetic plan and risks discussed with patient. Plan discussed with CRNA.                     Jesus Carlos MD   11/17/2023

## 2023-11-17 NOTE — PROGRESS NOTES
Pre ECT Assessment Note  Psychiatry  11/17/2023      Annetta Feng  1965  [id number]      Subjective:     Patient is a 62 y.o.  male seen for an evaluation prior to today's electroconvulsive therapy treatment. Today is treatment number  11 , utilizing bilateral.  This is course number 1. The patient has never previously completed a course of ECT treatment. Patient endorses an improvement in mood and his wife agrees. He endorses minimal cognitive side effects. His wife reports she noticed slight confusion in his memory but does not deem it as distressing or lasting. He denies any suicidal ideation. We will continue treatment on Monday and proceed with treatment twice next week. There are no problems to display for this patient. Past Medical History:   Diagnosis Date    Depression     GERD (gastroesophageal reflux disease)     Gout     Hypertension     Migraines     Pre-diabetes       Past Surgical History:   Procedure Laterality Date    APPENDECTOMY  2006    COLONOSCOPY      FINGER AMPUTATION Right     middle, partial      Not in a hospital admission. Allergies   Allergen Reactions    Codeine       Social History     Tobacco Use    Smoking status: Never    Smokeless tobacco: Never   Substance Use Topics    Alcohol use: Yes     Comment: social      History reviewed. No pertinent family history.        Objective:       Mental Status Evaluation:  Appearance:  Wearing gown, on stretcher, well groomed   Behavior:  Cooperative, pleasant   Speech:  Normal rate, volume and tone   Mood:  \"good'   Affect:  Congruent   Thought Process:  Linear and organized   Thought Content:  no hallucinations and no delusions   Sensorium:  Does not appear to attend to internal stimuli   Cognition:  Oriented to person, place and general circumstance   Insight:  good   Judgment:  good     Assessment:     Diagnosis: Major depressive disorder, recurrent severe without psychotic features    Plan:     Continue ECT as

## 2023-11-20 ENCOUNTER — HOSPITAL ENCOUNTER (OUTPATIENT)
Dept: POSTOP/PACU | Age: 58
Discharge: HOME | End: 2023-11-20
Payer: COMMERCIAL

## 2023-11-20 ENCOUNTER — ANESTHESIA (OUTPATIENT)
Dept: POSTOP/PACU | Age: 58
End: 2023-11-20
Payer: COMMERCIAL

## 2023-11-20 VITALS
HEIGHT: 72 IN | SYSTOLIC BLOOD PRESSURE: 144 MMHG | RESPIRATION RATE: 8 BRPM | WEIGHT: 197 LBS | DIASTOLIC BLOOD PRESSURE: 93 MMHG | OXYGEN SATURATION: 94 % | TEMPERATURE: 97 F | BODY MASS INDEX: 26.68 KG/M2 | HEART RATE: 72 BPM

## 2023-11-20 LAB — GLUCOSE BLD-MCNC: 141 MG/DL (ref 75–110)

## 2023-11-20 PROCEDURE — 3700000000 HC ANESTHESIA ATTENDED CARE

## 2023-11-20 PROCEDURE — 90870 ELECTROCONVULSIVE THERAPY: CPT

## 2023-11-20 PROCEDURE — 7100000000 HC PACU RECOVERY - FIRST 15 MIN

## 2023-11-20 PROCEDURE — 3700000001 HC ADD 15 MINUTES (ANESTHESIA)

## 2023-11-20 PROCEDURE — 7100000001 HC PACU RECOVERY - ADDTL 15 MIN

## 2023-11-20 PROCEDURE — 90870 ELECTROCONVULSIVE THERAPY: CPT | Performed by: PSYCHIATRY & NEUROLOGY

## 2023-11-20 PROCEDURE — 2580000003 HC RX 258: Performed by: ANESTHESIOLOGY

## 2023-11-20 PROCEDURE — 2500000003 HC RX 250 WO HCPCS: Performed by: NURSE ANESTHETIST, CERTIFIED REGISTERED

## 2023-11-20 PROCEDURE — 82947 ASSAY GLUCOSE BLOOD QUANT: CPT

## 2023-11-20 RX ORDER — SUCCINYLCHOLINE/SOD CL,ISO/PF 200MG/10ML
SYRINGE (ML) INTRAVENOUS
Status: COMPLETED
Start: 2023-11-20 | End: 2023-11-20

## 2023-11-20 RX ORDER — LIDOCAINE HYDROCHLORIDE 10 MG/ML
1 INJECTION, SOLUTION EPIDURAL; INFILTRATION; INTRACAUDAL; PERINEURAL
Status: DISCONTINUED | OUTPATIENT
Start: 2023-11-20 | End: 2023-11-21 | Stop reason: HOSPADM

## 2023-11-20 RX ORDER — SODIUM CHLORIDE 0.9 % (FLUSH) 0.9 %
5-40 SYRINGE (ML) INJECTION EVERY 12 HOURS SCHEDULED
Status: DISCONTINUED | OUTPATIENT
Start: 2023-11-20 | End: 2023-11-21 | Stop reason: HOSPADM

## 2023-11-20 RX ORDER — ETOMIDATE 2 MG/ML
INJECTION INTRAVENOUS PRN
Status: DISCONTINUED | OUTPATIENT
Start: 2023-11-20 | End: 2023-11-20 | Stop reason: SDUPTHER

## 2023-11-20 RX ORDER — SUCCINYLCHOLINE/SOD CL,ISO/PF 100 MG/5ML
SYRINGE (ML) INTRAVENOUS PRN
Status: DISCONTINUED | OUTPATIENT
Start: 2023-11-20 | End: 2023-11-20 | Stop reason: SDUPTHER

## 2023-11-20 RX ORDER — SODIUM CHLORIDE 9 MG/ML
INJECTION, SOLUTION INTRAVENOUS PRN
Status: DISCONTINUED | OUTPATIENT
Start: 2023-11-20 | End: 2023-11-21 | Stop reason: HOSPADM

## 2023-11-20 RX ORDER — SODIUM CHLORIDE, SODIUM LACTATE, POTASSIUM CHLORIDE, CALCIUM CHLORIDE 600; 310; 30; 20 MG/100ML; MG/100ML; MG/100ML; MG/100ML
INJECTION, SOLUTION INTRAVENOUS CONTINUOUS
Status: DISCONTINUED | OUTPATIENT
Start: 2023-11-20 | End: 2023-11-21 | Stop reason: HOSPADM

## 2023-11-20 RX ORDER — SODIUM CHLORIDE 0.9 % (FLUSH) 0.9 %
5-40 SYRINGE (ML) INJECTION PRN
Status: DISCONTINUED | OUTPATIENT
Start: 2023-11-20 | End: 2023-11-21 | Stop reason: HOSPADM

## 2023-11-20 RX ORDER — ETOMIDATE 2 MG/ML
INJECTION INTRAVENOUS
Status: COMPLETED
Start: 2023-11-20 | End: 2023-11-20

## 2023-11-20 RX ADMIN — Medication 180 MG: at 07:05

## 2023-11-20 RX ADMIN — SODIUM CHLORIDE, POTASSIUM CHLORIDE, SODIUM LACTATE AND CALCIUM CHLORIDE: 600; 310; 30; 20 INJECTION, SOLUTION INTRAVENOUS at 06:20

## 2023-11-20 RX ADMIN — ETOMIDATE INJECTION 20 MG: 2 SOLUTION INTRAVENOUS at 07:05

## 2023-11-20 ASSESSMENT — PAIN - FUNCTIONAL ASSESSMENT: PAIN_FUNCTIONAL_ASSESSMENT: 0-10

## 2023-11-20 ASSESSMENT — LIFESTYLE VARIABLES: SMOKING_STATUS: 0

## 2023-11-20 ASSESSMENT — ENCOUNTER SYMPTOMS
STRIDOR: 0
SHORTNESS OF BREATH: 0

## 2023-11-20 NOTE — INTERVAL H&P NOTE
Update History & Physical    The patient's History and Physical of October 23, 2023 was reviewed with the patient and I examined the patient. Any changes are as documented below. Brandon Fritz is 62 y.o.  male, here for ECT treatment. Last ECT treatment was 11/17/2023. Pt tolerated last treatment well. Rating average mood 8/10 with 10 being the best mood. Patient's sleep has been fair. Appetite has been fair. Pt is not suicidal. Pt is not homicidal. Pt auditory/visual hallucinations. Pt has been taking psychiatric medications as prescribed. Pt AAO x 3 in NAD. HRRR. No adventitious lung sounds. No respiratory distress. NPO p MN. Took tylenol this am with sip of water. Denies recent or current chest pain/pressure, palpitations, SOB, recent URI, fever or chills. Review vitals per RN flowsheet.        Electronically signed by JOURDAN Gloria CNP on 11/20/2023 at 5:46 AM

## 2023-11-20 NOTE — PROCEDURES
Bilateral ECT Procedure Report  Johnie Jasmine   11/20/2023 1965         Attending:Antoni Carr MD  Preprocedure diagnosis: Major depressive disorder, recurrent severe without psychotic features (F33.2)  Postprocedure diagnosis: SAME AS PRE-PROCEDURE DIAGNOSIS  Treatment Number: This is treatment # 12   Patient Status: Outpatient   Type of ECT: Bilateral Brief Pulse  Medications  Preprocedure: Tylenol 650mg  Anesthetic: Methohexital 100 mg  Paralytic: Succinylcholine 140 mg  Post procedure: none needed  mg  Cuff placement: Left Lower Extremity    MECTA Settings 1st Stimulus:     Pulse width: 1.0 ms   Frequency:  60 hz.   Duration:   3.0 seconds   Static Impedance: 218 ohms             Dynamic Impedance: 183 ohms             Motor Seizure Duration: 42 seconds  EEG Seizure Duration: 55 seconds             The patient's ECT treatment was performed using MECTA machine  . Timeout:  Time out was performed using two patient identifiers and confirmation of procedure. Patient Preparation: Preprocedure documentation, labs, H&P were all verified and reviewed. The patient was placed in supine position. EEG leads were placed for monitoring of seizure. Shinto areas bilaterally cleaned and prepped. Conductive gel  was applied over stimulus electrode site. The patient was pre-oxygenated. Procedure in detail: Medications were administered by anesthesia using intravenous access at the doses listed previously in this summary. Anesthetic administered and once confirmation the patient is anesthetized, the patient's blood pressure cuff on lower extremity was inflated to 100mmHg in excess of patient's blood pressure. At this time, the neuromuscular blockade was administered intravenously by anesthesia. Upper extremity fasciculation were verified followed by lower extremity fasciculation.   Once fasciculations terminated, confirmation of affective neuromuscular blockade demonstrated by absence of withdrawal

## 2023-11-20 NOTE — DISCHARGE INSTRUCTIONS
ELECTROCONVULSIVE THERAPY DISCHARGE INSTRUCTIONS         1. Activity - Rest today. The anesthetic agents you have received can make you feel drowsy or tired. A responsible person must drive you home and stay with you for 8 hours after discharge from the [address] [place] [address] not drive a motor vehicle or operate any machinery for 24 hours. .   *Do not make any complex decisions or execute any legal documents until 3 weeks AFTER your last treatment. If you have any questions regarding this, speak with your psychiatrist first.*    2. Diet - Nothing to eat or drink after midnight the night of your ECT treatment. After ECT, start with clear liquids, if you can drink without coughing, you may proceed with gradually progressing to your usual diet. No alcoholic beverages for 24 hours. 3. Medications - Take your daily medications as prescribed, after you return home from today's ECT. If you take a Beta Blocker or have been prescribed Imitrex, you may be instructed to take these medications the morning of ECT. If you are unsure please ask the physician. Take with these medication the morning of ECT with one Tablespoon of water. Tylenol 650 mg  All blood pressure medications  ***    4. You may experience the following after your treatment:   a. Poor memory   b. Poor balance   c. Headaches   d. Confusion   e. Muscle soreness   f. Nausea      5. Special Precautions - Contact you physician immediately if these occur:   a. Signs of infection: redness, swelling, heat, red streaks at the site of the IV   b. Excessive pain unrelieved by prescription pain medications   c. Nausea and vomiting that persists beyond the first day after your procedure    6. Next Procedure Date:   Your next ECT treatment is scheduled for 700 am on November 24th. Please arrive to the hospital by 540 am that morning. **You are also invited to join us at our monthly Electroconvulsive Therapy Peer Support Group.   This support group is held once a

## 2023-11-22 ENCOUNTER — ANESTHESIA EVENT (OUTPATIENT)
Dept: POSTOP/PACU | Age: 58
End: 2023-11-22
Payer: COMMERCIAL

## 2023-11-22 DIAGNOSIS — F33.2 SEVERE RECURRENT MAJOR DEPRESSION WITHOUT PSYCHOTIC FEATURES (HCC): Primary | ICD-10-CM

## 2023-11-22 NOTE — H&P
Drug use: No           REVIEW OF SYSTEMS      Allergies   Allergen Reactions    Codeine        Current Outpatient Medications on File Prior to Encounter   Medication Sig Dispense Refill    OZEMPIC, 1 MG/DOSE, 4 MG/3ML SOPN Inject 1 mL into the skin every 7 days Takes on Thursday      Ubrogepant (UBRELVY) 100 MG TABS Take 100 mg by mouth daily as needed      propranolol (INDERAL) 10 MG tablet 1 tablet      busPIRone (BUSPAR) 30 MG tablet Take 30 mg by mouth 2 times daily      VRAYLAR 3 MG CAPS capsule Take 1 capsule by mouth daily      omeprazole (PRILOSEC) 20 MG delayed release capsule Take 1 capsule by mouth daily      LOSARTAN POTASSIUM PO Take by mouth daily      METFORMIN HCL PO Take by mouth in the morning and at bedtime      venlafaxine (EFFEXOR) 50 MG tablet Take 1 tablet by mouth 3 times daily       No current facility-administered medications on file prior to encounter. Negative except for what is mentioned in the HPI. GENERAL PHYSICAL EXAM     Vitals :   See vital signs in RN flow sheet. GENERAL APPEARANCE:   Najma York is 62 y.o., male, moderately obese, nourished, conscious, alert. Does not appear to be distress or pain at this time. SKIN:  Warm, dry, no cyanosis or jaundice. HEAD:  Normocephalic, atraumatic, no swelling or tenderness. EYES:  Pupils equal, reactive to light. EARS:  No discharge, no marked hearing loss. NOSE:  No rhinorrhea, epistaxis or septal deformity. THROAT:  Not congested. No ulceration bleeding or discharge. NECK:  No stiffness, trachea central.  No palpable masses or L.N.                 CHEST:  Symmetrical and equal on expansion. HEART:  RRR . No audible murmurs or gallops. LUNGS:  Equal on expansion, normal breath sounds. No adventitious sounds. ABDOMEN:  Obese. Soft on palpation.   No dysphagia, No

## 2023-11-22 NOTE — H&P (VIEW-ONLY)
HISTORY and [id number]       NAME:  Felix Sepulveda  MRN: [id number]   YOB: 1965   Date: 11/22/2023   Age: 62 y.o. Gender: male       COMPLAINT AND PRESENT HISTORY:       Felix Sepulveda is 62 y.o.,  male, here for ECT treatment. Patient is being treated for  Major depressive disorder, recurrent severe without psychotic features . Last ECT treatment was 11/20/23 . Pt tolerated last treatment well. Pt reports ECT treatments have been effective at the current frequency. 3 times a week. Rating average mood 8/10  with 10 being the best mood. Pt admits to  no depression      Patient's sleep has been good . Appetite has been fine . Pt denies any thoughts of suicidal. Pt also denies any thoughts of homicidal. Pt denies auditory/visual hallucinations. Pt reports being compliant with taking all prescribed medications. Pt AAO x 3 in NAD. HRRR. No adventitious lung sounds. No respiratory distress. NPO p MN. Took nothing  this am. Denies recent or current chest pain/pressure, palpitations, SOB, recent URI, fever or chills. Patient denies any issues with Anesthesia. Significant medical history: Hypertension, HLD, PreDiabetes, Gout, Migraines. Associated medications: Losartan, Propanolol    PAST MEDICAL HISTORY     Past Medical History:   Diagnosis Date    Depression     GERD (gastroesophageal reflux disease)     Gout     Hypertension     Migraines     Pre-diabetes        SURGICAL HISTORY       Past Surgical History:   Procedure Laterality Date    APPENDECTOMY  2006    COLONOSCOPY      FINGER AMPUTATION Right     middle, partial       FAMILY HISTORY     No family history on file.     SOCIAL HISTORY       Social History     Socioeconomic History    Marital status:    Tobacco Use    Smoking status: Never    Smokeless tobacco: Never   Vaping Use    Vaping Use: Never used   Substance and Sexual Activity    Alcohol use: Yes     Comment: social

## 2023-11-24 ENCOUNTER — HOSPITAL ENCOUNTER (OUTPATIENT)
Dept: POSTOP/PACU | Age: 58
Discharge: HOME | End: 2023-11-24
Payer: COMMERCIAL

## 2023-11-24 ENCOUNTER — ANESTHESIA (OUTPATIENT)
Dept: POSTOP/PACU | Age: 58
End: 2023-11-24
Payer: COMMERCIAL

## 2023-11-24 VITALS
DIASTOLIC BLOOD PRESSURE: 81 MMHG | BODY MASS INDEX: 26.68 KG/M2 | OXYGEN SATURATION: 94 % | RESPIRATION RATE: 19 BRPM | TEMPERATURE: 97.5 F | SYSTOLIC BLOOD PRESSURE: 117 MMHG | WEIGHT: 197 LBS | HEART RATE: 88 BPM | HEIGHT: 72 IN

## 2023-11-24 LAB — GLUCOSE BLD-MCNC: 138 MG/DL (ref 75–110)

## 2023-11-24 PROCEDURE — 2500000003 HC RX 250 WO HCPCS: Performed by: ANESTHESIOLOGY

## 2023-11-24 PROCEDURE — 90870 ELECTROCONVULSIVE THERAPY: CPT

## 2023-11-24 PROCEDURE — 7100000001 HC PACU RECOVERY - ADDTL 15 MIN: Performed by: ANESTHESIOLOGY

## 2023-11-24 PROCEDURE — 2500000003 HC RX 250 WO HCPCS: Performed by: NURSE ANESTHETIST, CERTIFIED REGISTERED

## 2023-11-24 PROCEDURE — 3700000000 HC ANESTHESIA ATTENDED CARE: Performed by: ANESTHESIOLOGY

## 2023-11-24 PROCEDURE — 7100000000 HC PACU RECOVERY - FIRST 15 MIN: Performed by: ANESTHESIOLOGY

## 2023-11-24 PROCEDURE — 82947 ASSAY GLUCOSE BLOOD QUANT: CPT

## 2023-11-24 PROCEDURE — 90870 ELECTROCONVULSIVE THERAPY: CPT | Performed by: PSYCHIATRY & NEUROLOGY

## 2023-11-24 PROCEDURE — 2580000003 HC RX 258: Performed by: ANESTHESIOLOGY

## 2023-11-24 PROCEDURE — 3700000001 HC ADD 15 MINUTES (ANESTHESIA): Performed by: ANESTHESIOLOGY

## 2023-11-24 RX ORDER — SODIUM CHLORIDE, SODIUM LACTATE, POTASSIUM CHLORIDE, CALCIUM CHLORIDE 600; 310; 30; 20 MG/100ML; MG/100ML; MG/100ML; MG/100ML
INJECTION, SOLUTION INTRAVENOUS CONTINUOUS
Status: DISCONTINUED | OUTPATIENT
Start: 2023-11-24 | End: 2023-11-25 | Stop reason: HOSPADM

## 2023-11-24 RX ORDER — ETOMIDATE 2 MG/ML
INJECTION INTRAVENOUS
Status: COMPLETED
Start: 2023-11-24 | End: 2023-11-24

## 2023-11-24 RX ORDER — SODIUM CHLORIDE 9 MG/ML
INJECTION, SOLUTION INTRAVENOUS PRN
Status: DISCONTINUED | OUTPATIENT
Start: 2023-11-24 | End: 2023-11-25 | Stop reason: HOSPADM

## 2023-11-24 RX ORDER — SUCCINYLCHOLINE/SOD CL,ISO/PF 200MG/10ML
SYRINGE (ML) INTRAVENOUS PRN
Status: DISCONTINUED | OUTPATIENT
Start: 2023-11-24 | End: 2023-11-24 | Stop reason: SDUPTHER

## 2023-11-24 RX ORDER — SODIUM CHLORIDE 0.9 % (FLUSH) 0.9 %
5-40 SYRINGE (ML) INJECTION EVERY 12 HOURS SCHEDULED
Status: DISCONTINUED | OUTPATIENT
Start: 2023-11-24 | End: 2023-11-25 | Stop reason: HOSPADM

## 2023-11-24 RX ORDER — SODIUM CHLORIDE 0.9 % (FLUSH) 0.9 %
5-40 SYRINGE (ML) INJECTION PRN
Status: DISCONTINUED | OUTPATIENT
Start: 2023-11-24 | End: 2023-11-25 | Stop reason: HOSPADM

## 2023-11-24 RX ORDER — SUCCINYLCHOLINE/SOD CL,ISO/PF 200MG/10ML
SYRINGE (ML) INTRAVENOUS
Status: COMPLETED
Start: 2023-11-24 | End: 2023-11-24

## 2023-11-24 RX ORDER — ETOMIDATE 2 MG/ML
INJECTION INTRAVENOUS PRN
Status: DISCONTINUED | OUTPATIENT
Start: 2023-11-24 | End: 2023-11-24 | Stop reason: SDUPTHER

## 2023-11-24 RX ORDER — LIDOCAINE HYDROCHLORIDE 10 MG/ML
1 INJECTION, SOLUTION EPIDURAL; INFILTRATION; INTRACAUDAL; PERINEURAL
Status: COMPLETED | OUTPATIENT
Start: 2023-11-24 | End: 2023-11-24

## 2023-11-24 RX ADMIN — ETOMIDATE INJECTION 20 MG: 2 SOLUTION INTRAVENOUS at 07:04

## 2023-11-24 RX ADMIN — LIDOCAINE HYDROCHLORIDE 1 ML: 10 INJECTION, SOLUTION EPIDURAL; INFILTRATION; INTRACAUDAL; PERINEURAL at 06:12

## 2023-11-24 RX ADMIN — SODIUM CHLORIDE, POTASSIUM CHLORIDE, SODIUM LACTATE AND CALCIUM CHLORIDE: 600; 310; 30; 20 INJECTION, SOLUTION INTRAVENOUS at 06:13

## 2023-11-24 RX ADMIN — Medication 180 MG: at 07:04

## 2023-11-24 ASSESSMENT — PAIN - FUNCTIONAL ASSESSMENT: PAIN_FUNCTIONAL_ASSESSMENT: 0-10

## 2023-11-24 ASSESSMENT — LIFESTYLE VARIABLES: SMOKING_STATUS: 0

## 2023-11-24 ASSESSMENT — ENCOUNTER SYMPTOMS
SHORTNESS OF BREATH: 0
STRIDOR: 0

## 2023-11-24 ASSESSMENT — PAIN SCALES - GENERAL: PAINLEVEL_OUTOF10: 0

## 2023-11-24 NOTE — PROCEDURES
Bilateral ECT Procedure Report  Deshawn Montano   11/24/2023 1965         Attending:Antoni Carr MD  Preprocedure diagnosis: Major depressive disorder, recurrent severe without psychotic features (F33.2)  Postprocedure diagnosis: SAME AS PRE-PROCEDURE DIAGNOSIS  Treatment Number: This is treatment # 13   Patient Status: Outpatient   Type of ECT: Bilateral Brief Pulse  Medications  Preprocedure: Tylenol 650mg  Anesthetic: Etomidate 20 mg  Paralytic: Succinylcholine 180 mg  Post procedure: none needed  mg  Cuff placement: Left Lower Extremity    MECTA Settings 1st Stimulus:     Pulse width: 1.0 ms   Frequency:  60 hz.   Duration:   3.0 seconds   Static Impedance: 254 ohms             Dynamic Impedance: 174 ohms             Motor Seizure Duration: 42 seconds  EEG Seizure Duration: 60 seconds             The patient's ECT treatment was performed using MECTA machine  . Timeout:  Time out was performed using two patient identifiers and confirmation of procedure. Patient Preparation: Preprocedure documentation, labs, H&P were all verified and reviewed. The patient was placed in supine position. EEG leads were placed for monitoring of seizure. [place] areas bilaterally cleaned and prepped. Conductive gel  was applied over stimulus electrode site. The patient was pre-oxygenated. Procedure in detail: Medications were administered by anesthesia using intravenous access at the doses listed previously in this summary. Anesthetic administered and once confirmation the patient is anesthetized, the patient's blood pressure cuff on lower extremity was inflated to 100mmHg in excess of patient's blood pressure. At this time, the neuromuscular blockade was administered intravenously by anesthesia. Upper extremity fasciculation were verified followed by lower extremity fasciculation. Once fasciculations terminated, confirmation of affective neuromuscular blockade demonstrated by absence of withdrawal reflex.

## 2023-11-24 NOTE — PROGRESS NOTES
Judgment:  good     Assessment:     Diagnosis: Major depressive disorder, recurrent severe without psychotic features    Plan:     Continue bilateral ECT   Decrease frequency to once weekly, next treatment on Wednesday  Monitor for stability in symptoms     Update consent and H&P every 30 days while undergoing ECT treatment, update labs every 3 months. Patient verbalizes understanding of risks/benefits/alternatives to ECT.

## 2023-11-24 NOTE — DISCHARGE INSTRUCTIONS
bath.  Do not drink alcohol. Do not make any important decisions or sign legal documents. Stay with an adult. The first meal following your procedure should be light and small. Avoid solid foods if you feel sick to your stomach (nauseous) or if you throw up (vomit). Drink enough fluids to keep your urine clear or pale yellow. Only take your usual medicines or new medicines if your caregiver approves them. Only take over-the-counter or prescription medicines for pain, discomfort, or fever as directed by your caregiver. Keep all follow-up appointments as directed by your caregiver. SEEK IMMEDIATE MEDICAL CARE IF:   You are not feeling normal or behaving normally after 24 hours. You have persistent nausea and vomiting. You are unable to drink fluids or eat food. You have difficulty urinating. You have difficulty breathing or speaking. You have blue or gray skin. There is difficulty waking or you cannot be woken up. You have heavy bleeding, redness, or a lot of swelling where the sedative or anesthesia entered your skin (intravenous site). You have a rash. MAKE SURE YOU:  Understand these instructions. Will watch your condition. Will get help right away if you are not doing well or get worse. Document Released: 12/18/2006 Document Revised: 06/18/2013 Document Reviewed: 04/17/2013  [facility] Patient Information [id number] [facility].

## 2023-11-28 ENCOUNTER — ANESTHESIA EVENT (OUTPATIENT)
Dept: POSTOP/PACU | Age: 58
End: 2023-11-28
Payer: COMMERCIAL

## 2023-11-29 ENCOUNTER — ANESTHESIA (OUTPATIENT)
Dept: POSTOP/PACU | Age: 58
End: 2023-11-29
Payer: COMMERCIAL

## 2023-11-29 ENCOUNTER — HOSPITAL ENCOUNTER (OUTPATIENT)
Dept: POSTOP/PACU | Age: 58
Discharge: HOME | End: 2023-11-29
Payer: COMMERCIAL

## 2023-11-29 VITALS
SYSTOLIC BLOOD PRESSURE: 142 MMHG | TEMPERATURE: 98.1 F | RESPIRATION RATE: 14 BRPM | OXYGEN SATURATION: 95 % | DIASTOLIC BLOOD PRESSURE: 73 MMHG | HEIGHT: 72 IN | BODY MASS INDEX: 26.68 KG/M2 | WEIGHT: 197 LBS | HEART RATE: 78 BPM

## 2023-11-29 LAB — GLUCOSE BLD-MCNC: 151 MG/DL (ref 75–110)

## 2023-11-29 PROCEDURE — 7100000001 HC PACU RECOVERY - ADDTL 15 MIN: Performed by: ANESTHESIOLOGY

## 2023-11-29 PROCEDURE — 82947 ASSAY GLUCOSE BLOOD QUANT: CPT

## 2023-11-29 PROCEDURE — 2580000003 HC RX 258: Performed by: ANESTHESIOLOGY

## 2023-11-29 PROCEDURE — 90870 ELECTROCONVULSIVE THERAPY: CPT | Performed by: PSYCHIATRY & NEUROLOGY

## 2023-11-29 PROCEDURE — 3700000000 HC ANESTHESIA ATTENDED CARE: Performed by: ANESTHESIOLOGY

## 2023-11-29 PROCEDURE — 3700000001 HC ADD 15 MINUTES (ANESTHESIA): Performed by: ANESTHESIOLOGY

## 2023-11-29 PROCEDURE — 7100000000 HC PACU RECOVERY - FIRST 15 MIN: Performed by: ANESTHESIOLOGY

## 2023-11-29 PROCEDURE — 90870 ELECTROCONVULSIVE THERAPY: CPT

## 2023-11-29 PROCEDURE — 2500000003 HC RX 250 WO HCPCS: Performed by: NURSE ANESTHETIST, CERTIFIED REGISTERED

## 2023-11-29 RX ORDER — SODIUM CHLORIDE 9 MG/ML
INJECTION, SOLUTION INTRAVENOUS PRN
Status: DISCONTINUED | OUTPATIENT
Start: 2023-11-29 | End: 2023-11-30 | Stop reason: HOSPADM

## 2023-11-29 RX ORDER — ETOMIDATE 2 MG/ML
INJECTION INTRAVENOUS
Status: COMPLETED
Start: 2023-11-29 | End: 2023-11-29

## 2023-11-29 RX ORDER — LIDOCAINE HYDROCHLORIDE 10 MG/ML
1 INJECTION, SOLUTION EPIDURAL; INFILTRATION; INTRACAUDAL; PERINEURAL
Status: DISCONTINUED | OUTPATIENT
Start: 2023-11-29 | End: 2023-11-30 | Stop reason: HOSPADM

## 2023-11-29 RX ORDER — SODIUM CHLORIDE, SODIUM LACTATE, POTASSIUM CHLORIDE, CALCIUM CHLORIDE 600; 310; 30; 20 MG/100ML; MG/100ML; MG/100ML; MG/100ML
INJECTION, SOLUTION INTRAVENOUS CONTINUOUS
Status: DISCONTINUED | OUTPATIENT
Start: 2023-11-29 | End: 2023-11-30 | Stop reason: HOSPADM

## 2023-11-29 RX ORDER — ETOMIDATE 2 MG/ML
INJECTION INTRAVENOUS PRN
Status: DISCONTINUED | OUTPATIENT
Start: 2023-11-29 | End: 2023-11-29 | Stop reason: SDUPTHER

## 2023-11-29 RX ORDER — SUCCINYLCHOLINE/SOD CL,ISO/PF 200MG/10ML
SYRINGE (ML) INTRAVENOUS
Status: COMPLETED
Start: 2023-11-29 | End: 2023-11-29

## 2023-11-29 RX ORDER — SODIUM CHLORIDE 0.9 % (FLUSH) 0.9 %
5-40 SYRINGE (ML) INJECTION PRN
Status: DISCONTINUED | OUTPATIENT
Start: 2023-11-29 | End: 2023-11-30 | Stop reason: HOSPADM

## 2023-11-29 RX ORDER — SODIUM CHLORIDE 0.9 % (FLUSH) 0.9 %
5-40 SYRINGE (ML) INJECTION EVERY 12 HOURS SCHEDULED
Status: DISCONTINUED | OUTPATIENT
Start: 2023-11-29 | End: 2023-11-30 | Stop reason: HOSPADM

## 2023-11-29 RX ORDER — SUCCINYLCHOLINE/SOD CL,ISO/PF 200MG/10ML
SYRINGE (ML) INTRAVENOUS PRN
Status: DISCONTINUED | OUTPATIENT
Start: 2023-11-29 | End: 2023-11-29 | Stop reason: SDUPTHER

## 2023-11-29 RX ADMIN — SODIUM CHLORIDE, POTASSIUM CHLORIDE, SODIUM LACTATE AND CALCIUM CHLORIDE: 600; 310; 30; 20 INJECTION, SOLUTION INTRAVENOUS at 07:02

## 2023-11-29 RX ADMIN — ETOMIDATE INJECTION 20 MG: 2 SOLUTION INTRAVENOUS at 07:05

## 2023-11-29 RX ADMIN — Medication 180 MG: at 07:05

## 2023-11-29 ASSESSMENT — PAIN - FUNCTIONAL ASSESSMENT: PAIN_FUNCTIONAL_ASSESSMENT: 0-10

## 2023-11-29 NOTE — PROCEDURES
Bilateral ECT Procedure Report  Smith Fischer   11/29/2023 1965         Attending:Antoni Carr MD  Preprocedure diagnosis: Major depressive disorder, recurrent severe without psychotic features (F33.2)  Postprocedure diagnosis: SAME AS PRE-PROCEDURE DIAGNOSIS  Treatment Number: This is treatment # 14   Patient Status: Outpatient   Type of ECT: Bilateral Brief Pulse  Medications  Preprocedure: Tylenol 650mg  Anesthetic: Etomidate 20 mg  Paralytic: Succinylcholine 180 mg  Post procedure: none needed  mg  Cuff placement: Left Lower Extremity    MECTA Settings 1st Stimulus:     Pulse width: 1.0 ms   Frequency:  60 hz.   Duration:   3.0 seconds   Static Impedance: 259 ohms             Dynamic Impedance: 177 ohms             Motor Seizure Duration: 38 seconds  EEG Seizure Duration: 55 seconds             The patient's ECT treatment was performed using MECTA machine  . Timeout:  Time out was performed using two patient identifiers and confirmation of procedure. Patient Preparation: Preprocedure documentation, labs, H&P were all verified and reviewed. The patient was placed in supine position. EEG leads were placed for monitoring of seizure. Sabianism areas bilaterally cleaned and prepped. Conductive gel  was applied over stimulus electrode site. The patient was pre-oxygenated. Procedure in detail: Medications were administered by anesthesia using intravenous access at the doses listed previously in this summary. Anesthetic administered and once confirmation the patient is anesthetized, the patient's blood pressure cuff on lower extremity was inflated to 100mmHg in excess of patient's blood pressure. At this time, the neuromuscular blockade was administered intravenously by anesthesia. Upper extremity fasciculation were verified followed by lower extremity fasciculation. Once fasciculations terminated, confirmation of affective neuromuscular blockade demonstrated by absence of withdrawal reflex.

## 2023-11-29 NOTE — PROGRESS NOTES
Pre ECT Assessment Note  Psychiatry  11/29/2023      Vlad Holder  1965  [id number]      Subjective:     Patient is a 62 y.o.  male seen for an evaluation prior to today's electroconvulsive therapy treatment. Today is treatment number  14 , utilizing bilateral brief pulse. This is course number 1. The patient has never previously completed a course of ECT treatment. Nory Gregg is interviewed today today, with his wife present. He reports that he tolerated his last treatment well. His wife reports that his cognitive side effects are very obvious however are not distressing to him at this time. Both patient and wife endorse they are unsure if they are seeing a positive response to ECT. Patient is given hard copy of MADRS screening to complete at home tomorrow, and bring to ECT on Friday. Screening tool explained to patient and wife to determine if ECT treatment should continue based on the patient's outcome of treatments thus far compared to baseline. His initial MADRS score on 10/27/2023 was 33. There are no problems to display for this patient. Past Medical History:   Diagnosis Date    Depression     GERD (gastroesophageal reflux disease)     Gout     Hypertension     Migraines     Pre-diabetes       Past Surgical History:   Procedure Laterality Date    APPENDECTOMY  2006    COLONOSCOPY      FINGER AMPUTATION Right     middle, partial      Not in a hospital admission. Allergies   Allergen Reactions    Codeine       Social History     Tobacco Use    Smoking status: Never    Smokeless tobacco: Never   Substance Use Topics    Alcohol use: Yes     Comment: social      History reviewed. No pertinent family history.      Objective:       Mental Status Evaluation:  Appearance:  Wearing gown, on stretcher, well groomed   Behavior:  Cooperative   Speech:  Normal rate, volume and tone   Mood:  \"Okay\"   Affect:  Congruent, blunted   Thought Process:  Linear and organized, more forgetful    Thought Content:

## 2023-11-29 NOTE — PROGRESS NOTES
Pre ECT Assessment Note  Psychiatry  11/29/2023      Kamila Patino  1965  [id number]      Subjective:     Patient is a 62 y.o.  male seen for an evaluation prior to today's electroconvulsive therapy treatment. Today is treatment number  14 , utilizing bilateral brief pulse. This is course number 1. The patient has never previously completed a course of ECT treatment. Virlinda Halsted is interviewed today today, with his wife present. He reports that he did well since his last treatment. His wife reports that his cognitive side effects are very obvious however are not distressing to him at this time. Both patient and wife endorse they are unsure if they are seeing a positive response to ECT. Patient is given hard copy of MADRs screening to complete at home tomorrow, and bring to ECT on Friday. Screening tool explained to patient and wife to determine if ECT treatment should continue based on the patient's outcome of treatments thus far compared to baseline. There are no problems to display for this patient. Past Medical History:   Diagnosis Date    Depression     GERD (gastroesophageal reflux disease)     Gout     Hypertension     Migraines     Pre-diabetes       Past Surgical History:   Procedure Laterality Date    APPENDECTOMY  2006    COLONOSCOPY      FINGER AMPUTATION Right     middle, partial      Not in a hospital admission. Allergies   Allergen Reactions    Codeine       Social History     Tobacco Use    Smoking status: Never    Smokeless tobacco: Never   Substance Use Topics    Alcohol use: Yes     Comment: social      History reviewed. No pertinent family history.      Objective:       Mental Status Evaluation:  Appearance:  Wearing gown, on stretcher, well groomed   Behavior:  Cooperative, pleasant   Speech:  Normal rate, volume and tone   Mood:  \"Good\"   Affect:  Congruent   Thought Process:  Linear and organized, more forgetful    Thought Content:  no hallucinations and no delusions, denies

## 2023-11-29 NOTE — DISCHARGE INSTRUCTIONS
ELECTROCONVULSIVE THERAPY DISCHARGE INSTRUCTIONS         1. Activity - Rest today. The anesthetic agents you have received can make you feel drowsy or tired. A responsible person must drive you home and stay with you for 8 hours after discharge from the [address] [place] [address] not drive a motor vehicle or operate any machinery for 24 hours. .   *Do not make any complex decisions or execute any legal documents until 3 weeks AFTER your last treatment. If you have any questions regarding this, speak with your psychiatrist first.*    2. Diet - Nothing to eat or drink after midnight the night of your ECT treatment. After ECT, start with clear liquids, if you can drink without coughing, you may proceed with gradually progressing to your usual diet. No alcoholic beverages for 24 hours. 3. Medications - Take your daily medications as prescribed, after you return home from today's ECT. If you take a Beta Blocker or have been prescribed Imitrex, you may be instructed to take these medications the morning of ECT. If you are unsure please ask the physician. Take with these medication the morning of ECT with one Tablespoon of water. Tylenol 650 mg  All blood pressure medications  ***    4. You may experience the following after your treatment:   a. Poor memory   b. Poor balance   c. Headaches   d. Confusion   e. Muscle soreness   f. Nausea      5. Special Precautions - Contact you physician immediately if these occur:   a. Signs of infection: redness, swelling, heat, red streaks at the site of the IV   b. Excessive pain unrelieved by prescription pain medications   c. Nausea and vomiting that persists beyond the first day after your procedure    6. Next Procedure Date:   Your next ECT treatment is scheduled for 830 am on December 1st.  Please arrive to the hospital by 700 am that morning. **You are also invited to join us at our monthly Electroconvulsive Therapy Peer Support Group.   This support group is held once a month,

## 2023-11-30 ENCOUNTER — ANESTHESIA EVENT (OUTPATIENT)
Dept: POSTOP/PACU | Age: 58
End: 2023-11-30
Payer: COMMERCIAL

## 2023-12-01 ENCOUNTER — HOSPITAL ENCOUNTER (OUTPATIENT)
Dept: POSTOP/PACU | Age: 58
Discharge: HOME | End: 2023-12-01
Payer: COMMERCIAL

## 2023-12-01 ENCOUNTER — ANESTHESIA (OUTPATIENT)
Dept: POSTOP/PACU | Age: 58
End: 2023-12-01
Payer: COMMERCIAL

## 2023-12-01 VITALS
WEIGHT: 197 LBS | HEART RATE: 76 BPM | HEIGHT: 72 IN | TEMPERATURE: 97.5 F | RESPIRATION RATE: 14 BRPM | BODY MASS INDEX: 26.68 KG/M2 | SYSTOLIC BLOOD PRESSURE: 110 MMHG | OXYGEN SATURATION: 86 % | DIASTOLIC BLOOD PRESSURE: 82 MMHG

## 2023-12-01 LAB — GLUCOSE BLD-MCNC: 143 MG/DL (ref 75–110)

## 2023-12-01 PROCEDURE — 7100000000 HC PACU RECOVERY - FIRST 15 MIN: Performed by: ANESTHESIOLOGY

## 2023-12-01 PROCEDURE — 7100000001 HC PACU RECOVERY - ADDTL 15 MIN: Performed by: ANESTHESIOLOGY

## 2023-12-01 PROCEDURE — 90870 ELECTROCONVULSIVE THERAPY: CPT | Performed by: PSYCHIATRY & NEUROLOGY

## 2023-12-01 PROCEDURE — 2580000003 HC RX 258: Performed by: ANESTHESIOLOGY

## 2023-12-01 PROCEDURE — 82947 ASSAY GLUCOSE BLOOD QUANT: CPT

## 2023-12-01 PROCEDURE — 3700000000 HC ANESTHESIA ATTENDED CARE: Performed by: ANESTHESIOLOGY

## 2023-12-01 PROCEDURE — 90870 ELECTROCONVULSIVE THERAPY: CPT | Performed by: ANESTHESIOLOGY

## 2023-12-01 PROCEDURE — 6360000002 HC RX W HCPCS: Performed by: NURSE ANESTHETIST, CERTIFIED REGISTERED

## 2023-12-01 PROCEDURE — 3700000001 HC ADD 15 MINUTES (ANESTHESIA): Performed by: ANESTHESIOLOGY

## 2023-12-01 PROCEDURE — 2500000003 HC RX 250 WO HCPCS: Performed by: NURSE ANESTHETIST, CERTIFIED REGISTERED

## 2023-12-01 RX ORDER — LABETALOL HYDROCHLORIDE 5 MG/ML
INJECTION, SOLUTION INTRAVENOUS
Status: COMPLETED
Start: 2023-12-01 | End: 2023-12-01

## 2023-12-01 RX ORDER — SODIUM CHLORIDE 0.9 % (FLUSH) 0.9 %
5-40 SYRINGE (ML) INJECTION EVERY 12 HOURS SCHEDULED
Status: DISCONTINUED | OUTPATIENT
Start: 2023-12-01 | End: 2023-12-02 | Stop reason: HOSPADM

## 2023-12-01 RX ORDER — SUCCINYLCHOLINE/SOD CL,ISO/PF 200MG/10ML
SYRINGE (ML) INTRAVENOUS
Status: COMPLETED
Start: 2023-12-01 | End: 2023-12-01

## 2023-12-01 RX ORDER — SUCCINYLCHOLINE/SOD CL,ISO/PF 200MG/10ML
SYRINGE (ML) INTRAVENOUS PRN
Status: DISCONTINUED | OUTPATIENT
Start: 2023-12-01 | End: 2023-12-01 | Stop reason: SDUPTHER

## 2023-12-01 RX ORDER — SODIUM CHLORIDE, SODIUM LACTATE, POTASSIUM CHLORIDE, CALCIUM CHLORIDE 600; 310; 30; 20 MG/100ML; MG/100ML; MG/100ML; MG/100ML
INJECTION, SOLUTION INTRAVENOUS CONTINUOUS
Status: DISCONTINUED | OUTPATIENT
Start: 2023-12-01 | End: 2023-12-02 | Stop reason: HOSPADM

## 2023-12-01 RX ORDER — LABETALOL HYDROCHLORIDE 5 MG/ML
INJECTION, SOLUTION INTRAVENOUS PRN
Status: DISCONTINUED | OUTPATIENT
Start: 2023-12-01 | End: 2023-12-01 | Stop reason: SDUPTHER

## 2023-12-01 RX ORDER — ETOMIDATE 2 MG/ML
INJECTION INTRAVENOUS PRN
Status: DISCONTINUED | OUTPATIENT
Start: 2023-12-01 | End: 2023-12-01 | Stop reason: SDUPTHER

## 2023-12-01 RX ORDER — LIDOCAINE HYDROCHLORIDE 10 MG/ML
1 INJECTION, SOLUTION EPIDURAL; INFILTRATION; INTRACAUDAL; PERINEURAL
Status: DISCONTINUED | OUTPATIENT
Start: 2023-12-01 | End: 2023-12-02 | Stop reason: HOSPADM

## 2023-12-01 RX ORDER — SODIUM CHLORIDE 9 MG/ML
INJECTION, SOLUTION INTRAVENOUS PRN
Status: DISCONTINUED | OUTPATIENT
Start: 2023-12-01 | End: 2023-12-02 | Stop reason: HOSPADM

## 2023-12-01 RX ORDER — SODIUM CHLORIDE 0.9 % (FLUSH) 0.9 %
5-40 SYRINGE (ML) INJECTION PRN
Status: DISCONTINUED | OUTPATIENT
Start: 2023-12-01 | End: 2023-12-02 | Stop reason: HOSPADM

## 2023-12-01 RX ORDER — ETOMIDATE 2 MG/ML
INJECTION INTRAVENOUS
Status: COMPLETED
Start: 2023-12-01 | End: 2023-12-01

## 2023-12-01 RX ADMIN — SODIUM CHLORIDE, POTASSIUM CHLORIDE, SODIUM LACTATE AND CALCIUM CHLORIDE: 600; 310; 30; 20 INJECTION, SOLUTION INTRAVENOUS at 06:41

## 2023-12-01 RX ADMIN — Medication 200 MG: at 07:22

## 2023-12-01 RX ADMIN — ETOMIDATE 20 MG: 2 INJECTION, SOLUTION INTRAVENOUS at 07:22

## 2023-12-01 RX ADMIN — LABETALOL HYDROCHLORIDE 5 MG: 5 INJECTION, SOLUTION INTRAVENOUS at 07:33

## 2023-12-01 ASSESSMENT — PAIN - FUNCTIONAL ASSESSMENT: PAIN_FUNCTIONAL_ASSESSMENT: 0-10

## 2023-12-01 NOTE — DISCHARGE INSTRUCTIONS
ELECTROCONVULSIVE THERAPY DISCHARGE INSTRUCTIONS         1. Activity - Rest today. The anesthetic agents you have received can make you feel drowsy or tired. A responsible person must drive you home and stay with you for 8 hours after discharge from the [address] [place] [address] not drive a motor vehicle or operate any machinery for 24 hours. .   *Do not make any complex decisions or execute any legal documents until 3 weeks AFTER your last treatment. If you have any questions regarding this, speak with your psychiatrist first.*    2. Diet - Nothing to eat or drink after midnight the night of your ECT treatment. After ECT, start with clear liquids, if you can drink without coughing, you may proceed with gradually progressing to your usual diet. No alcoholic beverages for 24 hours. 3. Medications - Take your daily medications as prescribed, after you return home from today's ECT. If you take a Beta Blocker or have been prescribed Imitrex, you may be instructed to take these medications the morning of ECT. If you are unsure please ask the physician. Take with these medication the morning of ECT with one Tablespoon of water. Tylenol 650 mg  All blood pressure medications  ***    4. You may experience the following after your treatment:   a. Poor memory   b. Poor balance   c. Headaches   d. Confusion   e. Muscle soreness   f. Nausea      5. Special Precautions - Contact you physician immediately if these occur:   a. Signs of infection: redness, swelling, heat, red streaks at the site of the IV   b. Excessive pain unrelieved by prescription pain medications   c. Nausea and vomiting that persists beyond the first day after your procedure    6. Next Procedure Date:   Your next ECT treatment is scheduled for 800 am on December 8th. Please arrive to the hospital by 630 am that morning. **You are also invited to join us at our monthly Electroconvulsive Therapy Peer Support Group.   This support group is held once a month,

## 2023-12-01 NOTE — PROCEDURES
Bilateral ECT Procedure Report  Severiano Mcdonald   12/1/2023 1965         Attending:Antoni Carr MD  Preprocedure diagnosis: Major depressive disorder, recurrent severe without psychotic features (F33.2)  Postprocedure diagnosis: SAME AS PRE-PROCEDURE DIAGNOSIS  Treatment Number: This is treatment # 15   Patient Status: Outpatient   Type of ECT: Bilateral Brief Pulse  Medications  Preprocedure: Tylenol 650mg  Anesthetic: Etomidate 20 mg  Paralytic: Succinylcholine 180 mg  Post procedure: none needed  mg  Cuff placement: Left Lower Extremity    MECTA Settings 1st Stimulus:     Pulse width: 1.0 ms   Frequency:  60 hz.   Duration:   3.0 seconds   Static Impedance: 215 ohms             Dynamic Impedance: 187 ohms             Motor Seizure Duration: 36 seconds  EEG Seizure Duration: 48 seconds             The patient's ECT treatment was performed using MECTA machine  . Timeout:  Time out was performed using two patient identifiers and confirmation of procedure. Patient Preparation: Preprocedure documentation, labs, H&P were all verified and reviewed. The patient was placed in supine position. EEG leads were placed for monitoring of seizure. Ava areas bilaterally cleaned and prepped. Conductive gel  was applied over stimulus electrode site. The patient was pre-oxygenated. Procedure in detail: Medications were administered by anesthesia using intravenous access at the doses listed previously in this summary. Anesthetic administered and once confirmation the patient is anesthetized, the patient's blood pressure cuff on lower extremity was inflated to 100mmHg in excess of patient's blood pressure. At this time, the neuromuscular blockade was administered intravenously by anesthesia. Upper extremity fasciculation were verified followed by lower extremity fasciculation. Once fasciculations terminated, confirmation of affective neuromuscular blockade demonstrated by absence of withdrawal reflex.

## 2023-12-01 NOTE — H&P
HISTORY and [address]       NAME:  Johnie Jasmine  MRN: [id number]   YOB: 1965   Date: 12/1/2023   Age: 62 y.o. Gender: male     H&P Update Note    H&P from 11/22/2023 reviewed and updated. Patient examined. INTERVAL HISTORY:     Johnie Jasmine is 62 y.o.,  male, here for ECT treatment. Last ECT treatment was 11/29/23. Pt has ECT treatment three times per week  Pt tolerated last treatment well. Patient reports that his symptoms are improving. Patient has complaints of some short term memory loss  Mood is rated at  8/10. With 10 is the best mood  pt denies feeling of hopelessness, helplessness, or  low energy. Patient's sleep has been good . Appetite has been fair . Pt is not suicidal/ homicidal. Pt has no auditory/visual hallucinations. Pt has been taking psychiatric medications as prescribed. Pt does not have any other somatic complaints at this time. Patient is feeling well today, denies any fever/chills, chest pain, shortness of breath. Pt NPO since the past midnight, no am medication today     No interval changes to past medical history, social history, family history. Review of systems as stated above and otherwise negative. PHYSICAL EXAM:     Vitals: see nursing flow sheet for vital sings     Patient is alert and oriented, in no distress. Clemmie Numbers Heart rate and rhythm are regular. Lungs clear to auscultation bilaterally. Abdomen is soft, non tender. No pedal edema. No interval changes. I concur with the findings. Margaret Jay, APRN - CNP on 12/1/2023 at 6:02 AM       HISTORY and [address]         NAME:  Johnie Jasmine  MRN: [id number]   YOB: 1965   Date: 11/22/2023   Age: 62 y.o. Gender: male         COMPLAINT AND PRESENT HISTORY:         Johnie Jasmine is 62 y.o.,  male, here for ECT treatment.   Patient is being treated for  Major depressive disorder, recurrent severe without psychotic features

## 2023-12-01 NOTE — PROGRESS NOTES
Pre ECT Assessment Note  Psychiatry  12/1/2023      Kamila Patino  1965  [id number]    Subjective:     Patient is a 62 y.o.  male seen for an evaluation prior to today's electroconvulsive therapy treatment. Today is treatment number  15 , utilizing bilateral brief pulse. This is course number 1. The patient has never previously  completed a course of ECT treatment. Virlinda Halsted is interviewed today with his wife present. He reports he tolerated his last treatment well. His wife reports that his cognitive side effects are very obvious however are not distressing to him at this time. As instructed patient performed MADRS screening at home prior to ECT today. Updated MADRS is 18 compared to previous score of 33. Patient and wife endorse they are happy with his screening results. Patient and Dr. Regina Wiggins endorse that ECT treatments will continue but will decrease to 1x weekly. Patient will be monitored for improvement of symptoms. There are no problems to display for this patient. Screening Tools:    MADRS Score 12/1/2023 =  18 ,previous score =  33      There are no problems to display for this patient. Past Medical History:   Diagnosis Date    Depression     GERD (gastroesophageal reflux disease)     Gout     Hypertension     Migraines     Pre-diabetes       Past Surgical History:   Procedure Laterality Date    APPENDECTOMY  2006    COLONOSCOPY      FINGER AMPUTATION Right     middle, partial      Not in a hospital admission. Allergies   Allergen Reactions    Codeine       Social History     Tobacco Use    Smoking status: Never    Smokeless tobacco: Never   Substance Use Topics    Alcohol use: Yes     Comment: social      History reviewed. No pertinent family history.        Objective:       Mental Status Evaluation:  Appearance:  Wearing gown, on stretcher, well groomed   Behavior:  Cooperative, pleasant   Speech:  Normal rate, volume and tone   Mood:  \"Good\"   Affect:  Congruent   Thought Process:

## 2023-12-06 DIAGNOSIS — F33.2 SEVERE RECURRENT MAJOR DEPRESSION WITHOUT PSYCHOTIC FEATURES (HCC): Primary | ICD-10-CM

## 2023-12-07 ENCOUNTER — ANESTHESIA EVENT (OUTPATIENT)
Dept: POSTOP/PACU | Age: 58
End: 2023-12-07
Payer: COMMERCIAL

## 2023-12-07 DIAGNOSIS — F33.2 SEVERE RECURRENT MAJOR DEPRESSION WITHOUT PSYCHOTIC FEATURES (HCC): Primary | ICD-10-CM

## 2023-12-08 ENCOUNTER — ANESTHESIA (OUTPATIENT)
Dept: POSTOP/PACU | Age: 58
End: 2023-12-08
Payer: COMMERCIAL

## 2023-12-08 ENCOUNTER — HOSPITAL ENCOUNTER (OUTPATIENT)
Dept: POSTOP/PACU | Age: 58
Discharge: HOME | End: 2023-12-08
Payer: COMMERCIAL

## 2023-12-08 VITALS
RESPIRATION RATE: 18 BRPM | DIASTOLIC BLOOD PRESSURE: 86 MMHG | BODY MASS INDEX: 26.68 KG/M2 | WEIGHT: 197 LBS | SYSTOLIC BLOOD PRESSURE: 154 MMHG | TEMPERATURE: 97.2 F | OXYGEN SATURATION: 99 % | HEIGHT: 72 IN | HEART RATE: 65 BPM

## 2023-12-08 LAB — GLUCOSE BLD-MCNC: 120 MG/DL (ref 75–110)

## 2023-12-08 PROCEDURE — 2580000003 HC RX 258: Performed by: ANESTHESIOLOGY

## 2023-12-08 PROCEDURE — 7100000001 HC PACU RECOVERY - ADDTL 15 MIN: Performed by: ANESTHESIOLOGY

## 2023-12-08 PROCEDURE — 82947 ASSAY GLUCOSE BLOOD QUANT: CPT

## 2023-12-08 PROCEDURE — 3700000000 HC ANESTHESIA ATTENDED CARE: Performed by: ANESTHESIOLOGY

## 2023-12-08 PROCEDURE — 2500000003 HC RX 250 WO HCPCS: Performed by: NURSE ANESTHETIST, CERTIFIED REGISTERED

## 2023-12-08 PROCEDURE — 3700000001 HC ADD 15 MINUTES (ANESTHESIA): Performed by: ANESTHESIOLOGY

## 2023-12-08 PROCEDURE — 7100000000 HC PACU RECOVERY - FIRST 15 MIN: Performed by: ANESTHESIOLOGY

## 2023-12-08 PROCEDURE — 6360000002 HC RX W HCPCS: Performed by: NURSE ANESTHETIST, CERTIFIED REGISTERED

## 2023-12-08 PROCEDURE — 90870 ELECTROCONVULSIVE THERAPY: CPT | Performed by: ANESTHESIOLOGY

## 2023-12-08 PROCEDURE — 90870 ELECTROCONVULSIVE THERAPY: CPT | Performed by: PSYCHIATRY & NEUROLOGY

## 2023-12-08 RX ORDER — KETOROLAC TROMETHAMINE 30 MG/ML
INJECTION, SOLUTION INTRAMUSCULAR; INTRAVENOUS
Status: COMPLETED
Start: 2023-12-08 | End: 2023-12-08

## 2023-12-08 RX ORDER — SUCCINYLCHOLINE/SOD CL,ISO/PF 100 MG/5ML
SYRINGE (ML) INTRAVENOUS PRN
Status: DISCONTINUED | OUTPATIENT
Start: 2023-12-08 | End: 2023-12-08 | Stop reason: SDUPTHER

## 2023-12-08 RX ORDER — ONDANSETRON 2 MG/ML
INJECTION INTRAMUSCULAR; INTRAVENOUS
Status: COMPLETED
Start: 2023-12-08 | End: 2023-12-08

## 2023-12-08 RX ORDER — LIDOCAINE HYDROCHLORIDE 10 MG/ML
1 INJECTION, SOLUTION EPIDURAL; INFILTRATION; INTRACAUDAL; PERINEURAL
Status: DISCONTINUED | OUTPATIENT
Start: 2023-12-08 | End: 2023-12-09 | Stop reason: HOSPADM

## 2023-12-08 RX ORDER — KETOROLAC TROMETHAMINE 30 MG/ML
INJECTION, SOLUTION INTRAMUSCULAR; INTRAVENOUS PRN
Status: DISCONTINUED | OUTPATIENT
Start: 2023-12-08 | End: 2023-12-08 | Stop reason: SDUPTHER

## 2023-12-08 RX ORDER — SUCCINYLCHOLINE/SOD CL,ISO/PF 200MG/10ML
SYRINGE (ML) INTRAVENOUS
Status: COMPLETED
Start: 2023-12-08 | End: 2023-12-08

## 2023-12-08 RX ORDER — SODIUM CHLORIDE 0.9 % (FLUSH) 0.9 %
5-40 SYRINGE (ML) INJECTION EVERY 12 HOURS SCHEDULED
Status: DISCONTINUED | OUTPATIENT
Start: 2023-12-08 | End: 2023-12-09 | Stop reason: HOSPADM

## 2023-12-08 RX ORDER — ETOMIDATE 2 MG/ML
INJECTION INTRAVENOUS PRN
Status: DISCONTINUED | OUTPATIENT
Start: 2023-12-08 | End: 2023-12-08 | Stop reason: SDUPTHER

## 2023-12-08 RX ORDER — SODIUM CHLORIDE, SODIUM LACTATE, POTASSIUM CHLORIDE, CALCIUM CHLORIDE 600; 310; 30; 20 MG/100ML; MG/100ML; MG/100ML; MG/100ML
INJECTION, SOLUTION INTRAVENOUS CONTINUOUS
Status: DISCONTINUED | OUTPATIENT
Start: 2023-12-08 | End: 2023-12-09 | Stop reason: HOSPADM

## 2023-12-08 RX ORDER — SODIUM CHLORIDE 9 MG/ML
INJECTION, SOLUTION INTRAVENOUS PRN
Status: DISCONTINUED | OUTPATIENT
Start: 2023-12-08 | End: 2023-12-09 | Stop reason: HOSPADM

## 2023-12-08 RX ORDER — ETOMIDATE 2 MG/ML
INJECTION INTRAVENOUS
Status: COMPLETED
Start: 2023-12-08 | End: 2023-12-08

## 2023-12-08 RX ORDER — SODIUM CHLORIDE 0.9 % (FLUSH) 0.9 %
5-40 SYRINGE (ML) INJECTION PRN
Status: DISCONTINUED | OUTPATIENT
Start: 2023-12-08 | End: 2023-12-09 | Stop reason: HOSPADM

## 2023-12-08 RX ORDER — ONDANSETRON 2 MG/ML
INJECTION INTRAMUSCULAR; INTRAVENOUS PRN
Status: DISCONTINUED | OUTPATIENT
Start: 2023-12-08 | End: 2023-12-08 | Stop reason: SDUPTHER

## 2023-12-08 RX ADMIN — ONDANSETRON 4 MG: 2 INJECTION INTRAMUSCULAR; INTRAVENOUS at 07:15

## 2023-12-08 RX ADMIN — KETOROLAC TROMETHAMINE 30 MG: 30 INJECTION, SOLUTION INTRAMUSCULAR; INTRAVENOUS at 07:15

## 2023-12-08 RX ADMIN — Medication 200 MG: at 07:15

## 2023-12-08 RX ADMIN — SODIUM CHLORIDE, POTASSIUM CHLORIDE, SODIUM LACTATE AND CALCIUM CHLORIDE: 600; 310; 30; 20 INJECTION, SOLUTION INTRAVENOUS at 06:19

## 2023-12-08 RX ADMIN — ETOMIDATE INJECTION 20 MG: 2 SOLUTION INTRAVENOUS at 07:15

## 2023-12-08 ASSESSMENT — PAIN - FUNCTIONAL ASSESSMENT: PAIN_FUNCTIONAL_ASSESSMENT: NONE - DENIES PAIN

## 2023-12-08 NOTE — PROCEDURES
blocks were put in place. Stimulus leads then applied to stimulus electrode site bilaterally with the center of the lead placed approximately 1 inch superior to the midpoint of line between canthus and the tragus bilaterally. Static impedance was tested and within appropriate limits. MECTA settings were confirmed with nursing staff. Staff was cleared from the patient and dose of ECT stimulus was delivered. Motor seizure activity  was observed at duration noted and terminated. EEG seizure activity was observed of duration noted that was confirmed via monitoring EEG and terminated with appropriate postictal suppression noted on EEG. Static impedance and dynamic impedance were documented. Tourniquet on  lower extremity was released and recovery procedures initiated. The patient was mask ventilated during the procedure with no significant drops in oxygenation saturation and tolerated the procedure well without any notable adverse effects. Condition: The patient was in stable condition with stable vital signs and able to follow commands when moved to recovery.

## 2023-12-08 NOTE — H&P
Allergen Reactions    Codeine                       Current Outpatient Medications on File Prior to Encounter   Medication Sig Dispense Refill    OZEMPIC, 1 MG/DOSE, 4 MG/3ML SOPN Inject 1 mL into the skin every 7 days Takes on Thursday        Ubrogepant (UBRELVY) 100 MG TABS Take 100 mg by mouth daily as needed        propranolol (INDERAL) 10 MG tablet 1 tablet        busPIRone (BUSPAR) 30 MG tablet Take 30 mg by mouth 2 times daily        VRAYLAR 3 MG CAPS capsule Take 1 capsule by mouth daily        omeprazole (PRILOSEC) 20 MG delayed release capsule Take 1 capsule by mouth daily        LOSARTAN POTASSIUM PO Take by mouth daily        METFORMIN HCL PO Take by mouth in the morning and at bedtime        venlafaxine (EFFEXOR) 50 MG tablet Take 1 tablet by mouth 3 times daily          No current facility-administered medications on file prior to encounter. Negative except for what is mentioned in the HPI. GENERAL PHYSICAL EXAM      Vitals :   See vital signs in RN flow sheet. GENERAL APPEARANCE:   Renetta Christianson is 62 y.o., male, moderately obese, nourished, conscious, alert. Does not appear to be distress or pain at this time. SKIN:  Warm, dry, no cyanosis or jaundice. HEAD:  Normocephalic, atraumatic, no swelling or tenderness. EYES:  Pupils equal, reactive to light. EARS:  No discharge, no marked hearing loss. NOSE:  No rhinorrhea, epistaxis or septal deformity. THROAT:  Not congested. No ulceration bleeding or discharge. NECK:  No stiffness, trachea central.  No palpable masses or L.N.                 CHEST:  Symmetrical and equal on expansion. HEART:  RRR . No audible murmurs or gallops. LUNGS:  Equal on expansion, normal breath sounds. No adventitious sounds. ABDOMEN:  Obese. Soft on palpation. No dysphagia, No localized tenderness.   No

## 2023-12-08 NOTE — DISCHARGE INSTRUCTIONS
ELECTROCONVULSIVE THERAPY DISCHARGE INSTRUCTIONS         1. Activity - Rest today. The anesthetic agents you have received can make you feel drowsy or tired. A responsible person must drive you home and stay with you for 8 hours after discharge from the [address] [place] [address] not drive a motor vehicle or operate any machinery for 24 hours. .   *Do not make any complex decisions or execute any legal documents until 3 weeks AFTER your last treatment. If you have any questions regarding this, speak with your psychiatrist first.*    2. Diet - Nothing to eat or drink after midnight the night of your ECT treatment. After ECT, start with clear liquids, if you can drink without coughing, you may proceed with gradually progressing to your usual diet. No alcoholic beverages for 24 hours. 3. Medications - Take your daily medications as prescribed, after you return home from today's ECT. If you take a Beta Blocker or have been prescribed Imitrex, you may be instructed to take these medications the morning of ECT. If you are unsure please ask the physician. Take with these medication the morning of ECT with one Tablespoon of water. Tylenol 650 mg  All blood pressure medications  ***    4. You may experience the following after your treatment:   a. Poor memory   b. Poor balance   c. Headaches   d. Confusion   e. Muscle soreness   f. Nausea      5. Special Precautions - Contact you physician immediately if these occur:   a. Signs of infection: redness, swelling, heat, red streaks at the site of the IV   b. Excessive pain unrelieved by prescription pain medications   c. Nausea and vomiting that persists beyond the first day after your procedure    6. Next Procedure Date:   Your next ECT treatment is scheduled for 820 am on December 15th. Please arrive to the hospital by 700 am that morning. **You are also invited to join us at our monthly Electroconvulsive Therapy Peer Support Group.   This support group is held once a

## 2023-12-08 NOTE — ANESTHESIA PRE PROCEDURE
Results   Component Value Date/Time    WBC 7.0 10/23/2023 07:30 AM    RBC 4.92 10/23/2023 07:30 AM    HGB 13.4 10/23/2023 07:30 AM    HCT 40.6 10/23/2023 07:30 AM    MCV 82.7 10/23/2023 07:30 AM    RDW 16.1 10/23/2023 07:30 AM     10/23/2023 07:30 AM       CMP:   Lab Results   Component Value Date/Time     10/23/2023 07:30 AM    K 4.4 10/23/2023 07:30 AM    CL 99 10/23/2023 07:30 AM    CO2 27 10/23/2023 07:30 AM    BUN 15 10/23/2023 07:30 AM    CREATININE 1.0 10/23/2023 07:30 AM    GFRAA >60 04/08/2015 01:23 PM    LABGLOM >60 10/23/2023 07:30 AM    GLUCOSE 163 10/23/2023 07:30 AM    PROT 8.1 04/08/2015 01:23 PM    CALCIUM 9.2 10/23/2023 07:30 AM    BILITOT 1.1 04/08/2015 01:23 PM    ALKPHOS 79 04/08/2015 01:23 PM     04/08/2015 01:23 PM     04/08/2015 01:23 PM       POC Tests:   Recent Labs     12/08/23  0622   POCGLU 120*       Coags: No results found for: \"PROTIME\", \"INR\", \"APTT\"    HCG (If Applicable): No results found for: \"PREGTESTUR\", \"PREGSERUM\", \"HCG\", \"HCGQUANT\"     ABGs: No results found for: \"PHART\", \"PO2ART\", \"YYJ3NWG\", \"HVQ3JQL\", \"BEART\", \"Y8IIWKSJ\"     Type & Screen (If Applicable):  No results found for: \"LABABO\", \"LABRH\"    Drug/Infectious Status (If Applicable):  No results found for: \"HIV\", \"HEPCAB\"    COVID-19 Screening (If Applicable): No results found for: \"COVID19\"        Anesthesia Evaluation  Patient summary reviewed and Nursing notes reviewed no history of anesthetic complications:   Airway: Mallampati: II  TM distance: >3 FB   Neck ROM: full  Mouth opening: > = 3 FB   Dental: normal exam         Pulmonary:Negative Pulmonary ROS breath sounds clear to auscultation                             Cardiovascular:    (+) hypertension: no interval change,         Rhythm: regular  Rate: normal                    Neuro/Psych:   (+) headaches: migraine headaches, psychiatric history:            GI/Hepatic/Renal:   (+) GERD:,           Endo/Other: Negative Endo/Other ROS

## 2023-12-14 ENCOUNTER — ANESTHESIA EVENT (OUTPATIENT)
Dept: POSTOP/PACU | Age: 58
End: 2023-12-14
Payer: COMMERCIAL

## 2023-12-15 ENCOUNTER — ANESTHESIA (OUTPATIENT)
Dept: POSTOP/PACU | Age: 58
End: 2023-12-15
Payer: COMMERCIAL

## 2023-12-15 ENCOUNTER — HOSPITAL ENCOUNTER (OUTPATIENT)
Dept: POSTOP/PACU | Age: 58
Discharge: HOME | End: 2023-12-15
Payer: COMMERCIAL

## 2023-12-15 VITALS
SYSTOLIC BLOOD PRESSURE: 105 MMHG | HEIGHT: 72 IN | HEART RATE: 71 BPM | BODY MASS INDEX: 26.68 KG/M2 | WEIGHT: 197 LBS | TEMPERATURE: 97.3 F | RESPIRATION RATE: 17 BRPM | OXYGEN SATURATION: 97 % | DIASTOLIC BLOOD PRESSURE: 75 MMHG

## 2023-12-15 DIAGNOSIS — F33.2 SEVERE RECURRENT MAJOR DEPRESSION WITHOUT PSYCHOTIC FEATURES (HCC): Primary | ICD-10-CM

## 2023-12-15 LAB — GLUCOSE BLD-MCNC: 100 MG/DL (ref 75–110)

## 2023-12-15 PROCEDURE — 2500000003 HC RX 250 WO HCPCS: Performed by: NURSE ANESTHETIST, CERTIFIED REGISTERED

## 2023-12-15 PROCEDURE — 90870 ELECTROCONVULSIVE THERAPY: CPT | Performed by: PSYCHIATRY & NEUROLOGY

## 2023-12-15 PROCEDURE — 2580000003 HC RX 258: Performed by: ANESTHESIOLOGY

## 2023-12-15 PROCEDURE — 6360000002 HC RX W HCPCS: Performed by: NURSE ANESTHETIST, CERTIFIED REGISTERED

## 2023-12-15 PROCEDURE — 3700000001 HC ADD 15 MINUTES (ANESTHESIA): Performed by: ANESTHESIOLOGY

## 2023-12-15 PROCEDURE — 3700000000 HC ANESTHESIA ATTENDED CARE: Performed by: ANESTHESIOLOGY

## 2023-12-15 PROCEDURE — 90870 ELECTROCONVULSIVE THERAPY: CPT

## 2023-12-15 PROCEDURE — 7100000001 HC PACU RECOVERY - ADDTL 15 MIN: Performed by: ANESTHESIOLOGY

## 2023-12-15 PROCEDURE — 82947 ASSAY GLUCOSE BLOOD QUANT: CPT

## 2023-12-15 PROCEDURE — 2500000003 HC RX 250 WO HCPCS: Performed by: ANESTHESIOLOGY

## 2023-12-15 PROCEDURE — 7100000000 HC PACU RECOVERY - FIRST 15 MIN: Performed by: ANESTHESIOLOGY

## 2023-12-15 RX ORDER — SODIUM CHLORIDE 0.9 % (FLUSH) 0.9 %
5-40 SYRINGE (ML) INJECTION EVERY 12 HOURS SCHEDULED
Status: DISCONTINUED | OUTPATIENT
Start: 2023-12-15 | End: 2023-12-16 | Stop reason: HOSPADM

## 2023-12-15 RX ORDER — LABETALOL HYDROCHLORIDE 5 MG/ML
INJECTION, SOLUTION INTRAVENOUS PRN
Status: DISCONTINUED | OUTPATIENT
Start: 2023-12-15 | End: 2023-12-15 | Stop reason: SDUPTHER

## 2023-12-15 RX ORDER — SUCCINYLCHOLINE/SOD CL,ISO/PF 200MG/10ML
SYRINGE (ML) INTRAVENOUS
Status: COMPLETED
Start: 2023-12-15 | End: 2023-12-15

## 2023-12-15 RX ORDER — SODIUM CHLORIDE 9 MG/ML
INJECTION, SOLUTION INTRAVENOUS PRN
Status: DISCONTINUED | OUTPATIENT
Start: 2023-12-15 | End: 2023-12-16 | Stop reason: HOSPADM

## 2023-12-15 RX ORDER — ETOMIDATE 2 MG/ML
INJECTION INTRAVENOUS PRN
Status: DISCONTINUED | OUTPATIENT
Start: 2023-12-15 | End: 2023-12-15 | Stop reason: SDUPTHER

## 2023-12-15 RX ORDER — SODIUM CHLORIDE, SODIUM LACTATE, POTASSIUM CHLORIDE, CALCIUM CHLORIDE 600; 310; 30; 20 MG/100ML; MG/100ML; MG/100ML; MG/100ML
INJECTION, SOLUTION INTRAVENOUS CONTINUOUS
Status: DISCONTINUED | OUTPATIENT
Start: 2023-12-15 | End: 2023-12-16 | Stop reason: HOSPADM

## 2023-12-15 RX ORDER — SUCCINYLCHOLINE/SOD CL,ISO/PF 200MG/10ML
SYRINGE (ML) INTRAVENOUS PRN
Status: DISCONTINUED | OUTPATIENT
Start: 2023-12-15 | End: 2023-12-15 | Stop reason: SDUPTHER

## 2023-12-15 RX ORDER — SODIUM CHLORIDE 0.9 % (FLUSH) 0.9 %
5-40 SYRINGE (ML) INJECTION PRN
Status: DISCONTINUED | OUTPATIENT
Start: 2023-12-15 | End: 2023-12-16 | Stop reason: HOSPADM

## 2023-12-15 RX ORDER — LIDOCAINE HYDROCHLORIDE 10 MG/ML
1 INJECTION, SOLUTION EPIDURAL; INFILTRATION; INTRACAUDAL; PERINEURAL
Status: COMPLETED | OUTPATIENT
Start: 2023-12-15 | End: 2023-12-15

## 2023-12-15 RX ORDER — ETOMIDATE 2 MG/ML
INJECTION INTRAVENOUS
Status: COMPLETED
Start: 2023-12-15 | End: 2023-12-15

## 2023-12-15 RX ADMIN — LIDOCAINE HYDROCHLORIDE 1 ML: 10 INJECTION, SOLUTION EPIDURAL; INFILTRATION; INTRACAUDAL; PERINEURAL at 06:06

## 2023-12-15 RX ADMIN — ETOMIDATE INJECTION 20 MG: 2 SOLUTION INTRAVENOUS at 07:06

## 2023-12-15 RX ADMIN — SODIUM CHLORIDE, POTASSIUM CHLORIDE, SODIUM LACTATE AND CALCIUM CHLORIDE: 600; 310; 30; 20 INJECTION, SOLUTION INTRAVENOUS at 06:06

## 2023-12-15 RX ADMIN — Medication 200 MG: at 07:06

## 2023-12-15 RX ADMIN — LABETALOL HYDROCHLORIDE 5 MG: 5 INJECTION, SOLUTION INTRAVENOUS at 07:19

## 2023-12-15 ASSESSMENT — PAIN - FUNCTIONAL ASSESSMENT
PAIN_FUNCTIONAL_ASSESSMENT: 0-10
PAIN_FUNCTIONAL_ASSESSMENT: 0-10

## 2023-12-15 NOTE — INTERVAL H&P NOTE
Update History & Physical    The patient's History and Physical of November 24, 2023 was reviewed with the patient and I examined the patient. Any changes are as documented below. Areli Hernandez is 62 y.o.  male, here for ECT treatment. Last ECT treatment was 12/08/23. Pt tolerated last treatment well. Pt reports ECT treatments have been effective in improving overall mood. Rating overall mood 7/10 with 10 being the best mood. Patient's sleep has been fair. Appetite has been good. Pt is not suicidal. Pt is not homicidal. Pt denies auditory/visual hallucinations. Pt has been taking psychiatric medications as prescribed. Pt AAO x 3 in NAD. HRRR. No adventitious lung sounds. No respiratory distress. NPO p MN. Did not take any medications this am. Denies recent or current chest pain/pressure, palpitations, SOB, recent URI, fever or chills. Review vitals per RN flowsheet.          Electronically signed by JOURDAN Dobson CNP on 12/15/2023 at 5:39 AM

## 2023-12-15 NOTE — DISCHARGE INSTRUCTIONS
Sedation or General Anesthesia, Adult  Care After  Refer to this sheet in the next 24 hours. These instructions provide you with information on caring for yourself after your procedure. Your caregiver may also give you more specific instructions. Your treatment has been planned according to current medical practices, but problems sometimes occur. Call your caregiver if you have any problems or questions after your procedure. HOME CARE INSTRUCTIONS   Do not participate in any activities that require you to be alert or coordinated. Do not:  Drive. Swim. Ride a bicycle. Operate heavy machinery. Adri Karma. Use power tools. Climb ladders. Work at [employer]. Take a bath. Do not drink alcohol. Do not make any important decisions or sign legal documents. Stay with an adult. The first meal following your procedure should be light and small. Avoid solid foods if you feel sick to your stomach (nauseous) or if you throw up (vomit). Drink enough fluids to keep your urine clear or pale yellow. Only take your usual medicines or new medicines if your caregiver approves them. Only take over-the-counter or prescription medicines for pain, discomfort, or fever as directed by your caregiver. Keep all follow-up appointments as directed by your caregiver. SEEK IMMEDIATE MEDICAL CARE IF:   You are not feeling normal or behaving normally after 24 hours. You have persistent nausea and vomiting. You are unable to drink fluids or eat food. You have difficulty urinating. You have difficulty breathing or speaking. You have blue or gray skin. There is difficulty waking or you cannot be woken up. You have heavy bleeding, redness, or a lot of swelling where the sedative or anesthesia entered your skin (intravenous site). You have a rash. MAKE SURE YOU:  Understand these instructions. Will watch your condition. Will get help right away if you are not doing well or get worse.   Document Released: 12/18/2006 Document Revised:
Yes

## 2023-12-15 NOTE — PROCEDURES
Bilateral ECT Procedure Report  [facility]   12/15/[facility]23  1965         Attending:Antoni Carr MD  Preprocedure diagnosis: Major depressive disorder, recurrent severe without psychotic features (F33.2)  Postprocedure diagnosis: SAME AS PRE-PROCEDURE DIAGNOSIS  Treatment Number: This is treatment # 16   Patient Status: Outpatient   Type of ECT: Bilateral Brief Pulse  Medications  Preprocedure: Tylenol 650mg  Anesthetic: Etomidate 20 mg  Paralytic: Succinylcholine 180 mg  Post procedure: none needed  mg  Cuff placement: Left Lower Extremity    MECTA Settings 1st Stimulus:     Pulse width: 1.0 ms   Frequency:  60 hz.   Duration:   3.0 seconds   Static Impedance: 162 ohms             Dynamic Impedance: 156 ohms             Motor Seizure Duration: 47 seconds  EEG Seizure Duration: 60 seconds             The patient's ECT treatment was performed using MECTA machine  . Timeout:  Time out was performed using two patient identifiers and confirmation of procedure. Patient Preparation: Preprocedure documentation, labs, H&P were all verified and reviewed. The patient was placed in supine position. EEG leads were placed for monitoring of seizure. [place] areas bilaterally cleaned and prepped. Conductive gel  was applied over stimulus electrode site. The patient was pre-oxygenated. Procedure in detail: Medications were administered by anesthesia using intravenous access at the doses listed previously in this summary. Anesthetic administered and once confirmation the patient is anesthetized, the patient's blood pressure cuff on lower extremity was inflated to 100mmHg in excess of patient's blood pressure. At this time, the neuromuscular blockade was administered intravenously by anesthesia. Upper extremity fasciculation were verified followed by lower extremity fasciculation. Once fasciculations terminated, confirmation of affective neuromuscular blockade demonstrated by absence of withdrawal reflex.

## 2023-12-15 NOTE — PROGRESS NOTES
Pre ECT Assessment Note  Psychiatry  12/15/2023       Shell  1965  [id number]      Subjective:     Patient is a 62 y.o.  male seen for an evaluation prior to today's electroconvulsive therapy treatment. Today is treatment number  16 , utilizing bilateral brief pulse. This is course number 1. The patient has never previously completed a course of ECT treatment. Whitney Valdez is interviewed today today, with his wife present. They report that he is doing well, he went shopping on his own yesterday. He does report some improvement in the cognitive side effects he was experiencing now that we are spacing out the frequency of ECT. This is his second week of once weekly, he denies any decline in mood. We will move forward with once weekly ECT for 1 more week and likely start transitioning to every other week. We discussed ultimately we would follow up in outpatient clinic to determine if he should transition into a maintenance regimen or eventually take a break. They are both on board with this plan of care, the request that his next ECT be on Wednesday because of family presence for the holidays. MADRS 10/27/2023 = 33    Past Medical History:   Diagnosis Date    Depression     GERD (gastroesophageal reflux disease)     Gout     Hypertension     Migraines     Pre-diabetes       Past Surgical History:   Procedure Laterality Date    APPENDECTOMY  2006    COLONOSCOPY      FINGER AMPUTATION Right     middle, partial    OTHER SURGICAL HISTORY      ECT      Not in a hospital admission. Allergies   Allergen Reactions    Codeine       Social History     Tobacco Use    Smoking status: Never    Smokeless tobacco: Never   Substance Use Topics    Alcohol use: Yes     Comment: social      History reviewed. No pertinent family history.      Objective:       Mental Status Evaluation:  Appearance:  Wearing gown, on stretcher, well groomed   Behavior:  Cooperative, pleasant   Speech:  Normal rate, volume and tone

## 2023-12-28 DIAGNOSIS — F33.2 SEVERE RECURRENT MAJOR DEPRESSION WITHOUT PSYCHOTIC FEATURES (HCC): Primary | ICD-10-CM

## 2024-01-04 ENCOUNTER — ANESTHESIA EVENT (OUTPATIENT)
Dept: POSTOP/PACU | Age: 59
End: 2024-01-04
Payer: COMMERCIAL

## 2024-01-05 ENCOUNTER — HOSPITAL ENCOUNTER (OUTPATIENT)
Dept: POSTOP/PACU | Age: 59
Discharge: HOME | End: 2024-01-05
Payer: COMMERCIAL

## 2024-01-05 ENCOUNTER — ANESTHESIA (OUTPATIENT)
Dept: POSTOP/PACU | Age: 59
End: 2024-01-05
Payer: COMMERCIAL

## 2024-01-05 VITALS
BODY MASS INDEX: 25.73 KG/M2 | HEIGHT: 72 IN | WEIGHT: 190 LBS | DIASTOLIC BLOOD PRESSURE: 83 MMHG | RESPIRATION RATE: 14 BRPM | TEMPERATURE: 98 F | OXYGEN SATURATION: 96 % | HEART RATE: 87 BPM | SYSTOLIC BLOOD PRESSURE: 106 MMHG

## 2024-01-05 LAB — GLUCOSE BLD-MCNC: 127 MG/DL (ref 75–110)

## 2024-01-05 PROCEDURE — 7100000001 HC PACU RECOVERY - ADDTL 15 MIN: Performed by: ANESTHESIOLOGY

## 2024-01-05 PROCEDURE — 90870 ELECTROCONVULSIVE THERAPY: CPT | Performed by: PSYCHIATRY & NEUROLOGY

## 2024-01-05 PROCEDURE — 2500000003 HC RX 250 WO HCPCS: Performed by: NURSE ANESTHETIST, CERTIFIED REGISTERED

## 2024-01-05 PROCEDURE — 7100000000 HC PACU RECOVERY - FIRST 15 MIN: Performed by: ANESTHESIOLOGY

## 2024-01-05 PROCEDURE — 2580000003 HC RX 258: Performed by: ANESTHESIOLOGY

## 2024-01-05 PROCEDURE — 90870 ELECTROCONVULSIVE THERAPY: CPT | Performed by: ANESTHESIOLOGY

## 2024-01-05 PROCEDURE — 3700000000 HC ANESTHESIA ATTENDED CARE: Performed by: ANESTHESIOLOGY

## 2024-01-05 PROCEDURE — 82947 ASSAY GLUCOSE BLOOD QUANT: CPT

## 2024-01-05 RX ORDER — SUCCINYLCHOLINE/SOD CL,ISO/PF 200MG/10ML
SYRINGE (ML) INTRAVENOUS PRN
Status: DISCONTINUED | OUTPATIENT
Start: 2024-01-05 | End: 2024-01-05 | Stop reason: SDUPTHER

## 2024-01-05 RX ORDER — METHOHEXITAL IN WATER/PF 100MG/10ML
SYRINGE (ML) INTRAVENOUS PRN
Status: DISCONTINUED | OUTPATIENT
Start: 2024-01-05 | End: 2024-01-05 | Stop reason: SDUPTHER

## 2024-01-05 RX ORDER — SODIUM CHLORIDE, SODIUM LACTATE, POTASSIUM CHLORIDE, CALCIUM CHLORIDE 600; 310; 30; 20 MG/100ML; MG/100ML; MG/100ML; MG/100ML
INJECTION, SOLUTION INTRAVENOUS CONTINUOUS
Status: DISCONTINUED | OUTPATIENT
Start: 2024-01-05 | End: 2024-01-06 | Stop reason: HOSPADM

## 2024-01-05 RX ORDER — LIDOCAINE HYDROCHLORIDE 10 MG/ML
1 INJECTION, SOLUTION EPIDURAL; INFILTRATION; INTRACAUDAL; PERINEURAL
Status: DISCONTINUED | OUTPATIENT
Start: 2024-01-05 | End: 2024-01-06 | Stop reason: HOSPADM

## 2024-01-05 RX ORDER — SODIUM CHLORIDE 9 MG/ML
INJECTION, SOLUTION INTRAVENOUS PRN
Status: DISCONTINUED | OUTPATIENT
Start: 2024-01-05 | End: 2024-01-06 | Stop reason: HOSPADM

## 2024-01-05 RX ORDER — SODIUM CHLORIDE 0.9 % (FLUSH) 0.9 %
5-40 SYRINGE (ML) INJECTION PRN
Status: DISCONTINUED | OUTPATIENT
Start: 2024-01-05 | End: 2024-01-06 | Stop reason: HOSPADM

## 2024-01-05 RX ORDER — SUCCINYLCHOLINE/SOD CL,ISO/PF 200MG/10ML
SYRINGE (ML) INTRAVENOUS
Status: COMPLETED
Start: 2024-01-05 | End: 2024-01-05

## 2024-01-05 RX ORDER — METHOHEXITAL IN WATER/PF 100MG/10ML
SYRINGE (ML) INTRAVENOUS
Status: COMPLETED
Start: 2024-01-05 | End: 2024-01-05

## 2024-01-05 RX ORDER — SODIUM CHLORIDE 0.9 % (FLUSH) 0.9 %
5-40 SYRINGE (ML) INJECTION EVERY 12 HOURS SCHEDULED
Status: DISCONTINUED | OUTPATIENT
Start: 2024-01-05 | End: 2024-01-06 | Stop reason: HOSPADM

## 2024-01-05 RX ADMIN — SODIUM CHLORIDE, POTASSIUM CHLORIDE, SODIUM LACTATE AND CALCIUM CHLORIDE: 600; 310; 30; 20 INJECTION, SOLUTION INTRAVENOUS at 06:29

## 2024-01-05 RX ADMIN — Medication 100 MG: at 07:00

## 2024-01-05 RX ADMIN — Medication 200 MG: at 07:00

## 2024-01-05 ASSESSMENT — PAIN - FUNCTIONAL ASSESSMENT
PAIN_FUNCTIONAL_ASSESSMENT: NONE - DENIES PAIN
PAIN_FUNCTIONAL_ASSESSMENT: NONE - DENIES PAIN

## 2024-01-05 ASSESSMENT — ENCOUNTER SYMPTOMS
CONSTIPATION: 0
DIARRHEA: 0
VOMITING: 0
SORE THROAT: 0
BACK PAIN: 0
SHORTNESS OF BREATH: 0
COUGH: 0
NAUSEA: 0

## 2024-01-05 NOTE — DISCHARGE INSTRUCTIONS
ELECTROCONVULSIVE THERAPY DISCHARGE INSTRUCTIONS         1. Activity - Rest today. The anesthetic agents you have received can make you feel drowsy or tired.  A responsible person must drive you home and stay with you for 8 hours after discharge from the Hospital. Do not drive a motor vehicle or operate any machinery for 24 hours. .   *Do not make any complex decisions or execute any legal documents until 3 weeks AFTER your last treatment.  If you have any questions regarding this, speak with your psychiatrist first.*    2. Diet - Nothing to eat or drink after midnight the night of your ECT treatment. After ECT, start with clear liquids, if you can drink without coughing, you may proceed with gradually progressing to your usual diet.    No alcoholic beverages for 24 hours.    3. Medications - Take your daily medications as prescribed, after you return home from today's ECT. If you take a Beta Blocker or have been prescribed Imitrex, you may be instructed to take these medications the morning of ECT. If you are unsure please ask the physician.     Take with these medication the morning of ECT with one Tablespoon of water.   Tylenol 650 mg  All blood pressure medications  ***    4. You may experience the following after your treatment:   a. Poor memory   b. Poor balance   c. Headaches   d. Confusion   e. Muscle soreness   f. Nausea      5. Special Precautions - Contact you physician immediately if these occur:   a. Signs of infection: redness, swelling, heat, red streaks at the site of the IV   b. Excessive pain unrelieved by prescription pain medications   c. Nausea and vomiting that persists beyond the first day after your procedure    6. Next Procedure Date:   Dr Carr will call you with a 4 - 6 week follow up.     **You are also invited to join us at our monthly Electroconvulsive Therapy Peer Support Group.  This support group is held once a month, on the last Monday of the month with holiday exceptions.

## 2024-01-05 NOTE — PROCEDURES
Bilateral ECT Procedure Report  Samuel Hartley   1/5/2024 1965         Attending:Antoni Carr MD  Preprocedure diagnosis: Major depressive disorder, recurrent severe without psychotic features (F33.2)  Postprocedure diagnosis: SAME AS PRE-PROCEDURE DIAGNOSIS  Treatment Number: This is treatment # 18   Patient Status: Outpatient   Type of ECT: Bilateral Brief Pulse  Medications  Preprocedure: Tylenol 650mg  Anesthetic: Etomidate 20 mg  Paralytic: Succinylcholine 180 mg  Post procedure: none needed  mg  Cuff placement: Left Lower Extremity    MECTA Settings 1st Stimulus:     Pulse width: 1.0 ms   Frequency:  60 hz.   Duration:   3.0 seconds   Static Impedance: 226 ohms             Dynamic Impedance: 160 ohms             Motor Seizure Duration: 34 seconds  EEG Seizure Duration: 51 seconds             The patient's ECT treatment was performed using MECTA machine  .  Timeout:  Time out was performed using two patient identifiers and confirmation of procedure.     Patient Preparation: Preprocedure documentation, labs, H&P were all verified and reviewed.  The patient was placed in supine position.  EEG leads were placed for monitoring of seizure.   Latter day areas bilaterally cleaned and prepped.  Conductive gel  was applied over stimulus electrode site.   The patient was pre-oxygenated.       Procedure in detail: Medications were administered by anesthesia using intravenous access at the doses listed previously in this summary.  Anesthetic administered and once confirmation the patient is anesthetized, the patient's blood pressure cuff on lower extremity was inflated to 100mmHg in excess of patient's blood pressure.  At this time, the neuromuscular blockade was administered intravenously by anesthesia.  Upper extremity fasciculation were verified followed by lower extremity fasciculation.  Once fasciculations terminated, confirmation of affective neuromuscular blockade demonstrated by absence of withdrawal reflex.

## 2024-01-05 NOTE — H&P
HISTORY and PHYSICAL  [facility]       NAME:  Samuel Hartley  MRN: [id number]   YOB: 1965   Date: 1/5/2024   Age: 58 y.o.  Gender: male       COMPLAINT AND PRESENT HISTORY:     Samuel Hartley is 58 y.o.  male, here for ECT treatment. Last ECT treatment was 12/20/23. Pt tolerated last treatment well. Pt has history of major depressive disorder. Rating mood 8/10 with 10 being the best mood. Patient's sleep has been fair. Appetite has been good. Pt is not suicidal. Pt is not homicidal. Pt denies auditory/visual hallucinations. Pt has been taking all medications as prescribed.     NPO p MN. Took no medications this am. Denies recent or current chest pain/pressure, palpitations, SOB, recent URI, fever or chills.       RECENT LABS, IMAGING AND TESTING     Lab Results   Component Value Date    WBC 7.0 10/23/2023    RBC 4.92 10/23/2023    HGB 13.4 (L) 10/23/2023    HCT 40.6 (L) 10/23/2023    MCV 82.7 10/23/2023    MCH 27.3 10/23/2023    MCHC 33.1 10/23/2023    RDW 16.1 (H) 10/23/2023     10/23/2023    MPV 7.7 10/23/2023        Lab Results   Component Value Date     10/23/2023    K 4.4 10/23/2023    CL 99 10/23/2023    CO2 27 10/23/2023    BUN 15 10/23/2023    CREATININE 1.0 10/23/2023    GLUCOSE 163 (H) 10/23/2023    CALCIUM 9.2 10/23/2023    PROT 8.1 04/08/2015    LABALBU 4.9 04/08/2015    BILITOT 1.1 04/08/2015    ALKPHOS 79 04/08/2015     (H) 04/08/2015     (H) 04/08/2015     Narrative & Impression    Normal sinus rhythm  Normal ECG  No previous ECGs available      Specimen Collected: 10/23/23 07:18 EDT Last Resulted: 10/24/23 21:15 EDT          PAST MEDICAL HISTORY     Past Medical History:   Diagnosis Date    Depression     GERD (gastroesophageal reflux disease)     Gout     Hypertension     Migraines     Pre-diabetes        SURGICAL HISTORY       Past Surgical History:   Procedure Laterality Date    APPENDECTOMY  2006    COLONOSCOPY      FINGER AMPUTATION Right

## 2024-01-05 NOTE — ANESTHESIA PRE PROCEDURE
reviewed  Rhythm: regular  Rate: normal           Beta Blocker:  Not on Beta Blocker         Neuro/Psych:   (+) headaches:, psychiatric history:            GI/Hepatic/Renal:   (+) GERD:          Endo/Other:    (+) DiabetesType II DM.                 Abdominal:             Vascular: negative vascular ROS.         Other Findings:         Anesthesia Plan      general     ASA 3     (TIVA  Please give Toradol Intra op)  Induction: intravenous.    MIPS: Prophylactic antiemetics administered.  Anesthetic plan and risks discussed with patient.      Plan discussed with CRNA.                  Ghassan Wikls MD   1/5/2024

## 2024-01-05 NOTE — ANESTHESIA POSTPROCEDURE EVALUATION
Department of Anesthesiology  Postprocedure Note    Patient: Samuel Hartley  MRN: [id number]  YOB: 1965  Date of evaluation: 1/5/2024    Procedure Summary     Date: 01/05/24 Room / Location: [facility] PACU    Anesthesia Start: 0657 Anesthesia Stop: 0711    Procedure: ECT W/ ANESTHESIA Diagnosis: Major depressive disorder, recurrent severe without psychotic features    Scheduled Providers:  Responsible Provider: Aarti Posadas MD    Anesthesia Type: General ASA Status: 3          Anesthesia Type: General    Itz Phase I: Itz Score: 5    Itz Phase II:      Anesthesia Post Evaluation    Comments: POST- ANESTHESIA EVALUATION       Pt Name: Samuel Hartley  MRN: [id number]  YOB: 1965  Date of evaluation: 1/5/2024  Time:  8:54 AM      /83   Pulse 87   Temp 98 °F (36.7 °C) (Infrared)   Resp 14   Ht 1.829 m (6')   Wt 86.2 kg (190 lb)   SpO2 96%   BMI 25.77 kg/m²      Consciousness Level  Awake  Cardiopulmonary Status  Stable  Pain Adequately Treated YES  Nausea / Vomiting  NO  Adequate Hydration  YES  Anesthesia Related Complications NONE      Electronically signed by Aarti Posadas MD on 1/5/2024 at 8:54 AM        No notable events documented.

## 2024-01-05 NOTE — PROGRESS NOTES
Pre ECT Assessment Note  Psychiatry  1/5/2024      Samuel Hartley  1965  [id number]      Subjective:     Patient is a 58 y.o.  male seen for an evaluation prior to today's electroconvulsive therapy treatment. Today is treatment number  18 , utilizing bilateral brief pulse. This is course number 1.  The patient has never previously completed a course of ECT treatment.    Samuel is interviewed today today, with his wife present.  He has done well with decreasing the frequency of ECT.  His cognitive side effects are starting to dissipate.  After discussion with the attending, we will forgo any additional ECT and follow-up in the outpatient clinic to determine if we should take a break altogether or schedule some sort of maintenance frequency.  His overall affect is much brighter, he smiles and is interactive during interview.  He denies any recent thoughts of self-harm and his overall motivation has significantly improved.      MADRS 10/27/2023 = 33    Past Medical History:   Diagnosis Date    Depression     GERD (gastroesophageal reflux disease)     Gout     Hypertension     Migraines     Pre-diabetes       Past Surgical History:   Procedure Laterality Date    APPENDECTOMY  2006    COLONOSCOPY      FINGER AMPUTATION Right     middle, partial    OTHER SURGICAL HISTORY      ECT      Not in a hospital admission.  Allergies   Allergen Reactions    Codeine       Social History     Tobacco Use    Smoking status: Never    Smokeless tobacco: Never   Substance Use Topics    Alcohol use: Yes     Comment: social      History reviewed. No pertinent family history.     Objective:       Mental Status Evaluation:  Appearance:  Wearing gown, on stretcher, well groomed   Behavior:  Cooperative, pleasant   Speech:  Normal rate, volume and tone   Mood:  \"Good\"   Affect:  Congruent   Thought Process:  Linear and organized   Thought Content:  no hallucinations and no delusions, denies suicidal ideation    Sensorium:  Does not

## 2024-03-13 ENCOUNTER — HOSPITAL ENCOUNTER (OUTPATIENT)
Dept: PSYCHIATRY | Age: 59
Setting detail: THERAPIES SERIES
Discharge: HOME | End: 2024-03-13
Payer: COMMERCIAL

## 2024-03-13 DIAGNOSIS — F33.2 SEVERE RECURRENT MAJOR DEPRESSION WITHOUT PSYCHOTIC FEATURES (HCC): Primary | ICD-10-CM

## 2024-03-13 PROCEDURE — 99213 OFFICE O/P EST LOW 20 MIN: CPT | Performed by: PSYCHIATRY & NEUROLOGY

## 2024-03-13 NOTE — PROGRESS NOTES
Psychiatry Progress  Note     Samuel Hartley  59 y.o.  male      3/13/2024    TELEHEALTH EVALUATION -- Audio/Visual    HPI:    Samuel Hartley (:  1965) has requested an audio/video evaluation for the following concern(s):    Follow-up of ECT    Samuel Hartley, was evaluated through a synchronous (real-time) audio-video encounter. The patient (or guardian if applicable) is aware that this is a billable service, which includes applicable co-pays. This Virtual Visit was conducted with patient's (and/or legal guardian's) consent. Patient identification was verified, and a caregiver was present when appropriate.   The patient was located at Home: [address] [place] [address]  Provider was located at Facility (Appt Dept): [address] [place] [address]  I confirm that I am licensed to provide services in the [facility]      Total time spent on this encounter: Not billed by time    --BRYAN JAMES MD on 3/13/2024 at 1:13 PM    An electronic signature was used to authenticate this note.   Patient's chart reviewed .     Samuel is seen today for follow-up after completing 18 treatments of ECT (including taper down process) with his last treatment on 2024.  Samuel reports that things have been going very well for him.  He is noticed a sustained improvement in his mood and anxiety.  He denies feeling down sad or depressed nearly all day every day.  He does note that he may have a bad day \"here or there\" but that it does not persist and he easily bounces back.  He has not had any suicidal thoughts since the time that we stopped ECT.  He reports that he was feeling anxious about potentially having to go back to work but has now secured disability and he finds this to be a relief.  He reports that \" is kind of a blank\" when we probed his memory and cognitive side effects.  However he does state that he is making new memories just fine.  He also states that with some prompting he is able to

## 2024-04-18 ENCOUNTER — TELEPHONE (OUTPATIENT)
Dept: PSYCHIATRY | Age: 59
End: 2024-04-18

## 2024-04-18 NOTE — TELEPHONE ENCOUNTER
Patient called and has recently declined in mood. He would like to get on the scheduled for ECT. Did you want to see him in clinic first or is he ok to be scheduled?

## 2024-04-23 ENCOUNTER — HOSPITAL ENCOUNTER (OUTPATIENT)
Dept: PSYCHIATRY | Age: 59
Setting detail: THERAPIES SERIES
Discharge: HOME | End: 2024-04-23